# Patient Record
Sex: MALE | Race: WHITE | URBAN - METROPOLITAN AREA
[De-identification: names, ages, dates, MRNs, and addresses within clinical notes are randomized per-mention and may not be internally consistent; named-entity substitution may affect disease eponyms.]

---

## 2018-03-02 ENCOUNTER — OFFICE VISIT (OUTPATIENT)
Dept: INTERNAL MEDICINE CLINIC | Age: 63
End: 2018-03-02

## 2018-03-02 VITALS
BODY MASS INDEX: 28 KG/M2 | HEART RATE: 93 BPM | OXYGEN SATURATION: 95 % | HEIGHT: 71 IN | SYSTOLIC BLOOD PRESSURE: 133 MMHG | DIASTOLIC BLOOD PRESSURE: 73 MMHG | TEMPERATURE: 98.7 F | RESPIRATION RATE: 18 BRPM | WEIGHT: 200 LBS

## 2018-03-02 DIAGNOSIS — G25.0 ESSENTIAL TREMOR: ICD-10-CM

## 2018-03-02 DIAGNOSIS — Z98.890 HX OF NECK SURGERY: ICD-10-CM
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
## 2018-03-02 DIAGNOSIS — S02.2XXS CLOSED FRACTURE OF NASAL BONE, SEQUELA: ICD-10-CM

## 2018-03-02 DIAGNOSIS — R51.9 NONINTRACTABLE HEADACHE, UNSPECIFIED CHRONICITY PATTERN, UNSPECIFIED HEADACHE TYPE: ICD-10-CM

## 2018-03-02 DIAGNOSIS — C79.82 METASTATIC ADENOCARCINOMA TO PROSTATE (HCC): Primary | ICD-10-CM

## 2018-03-02 DIAGNOSIS — F41.9 ANXIETY: ICD-10-CM

## 2018-03-02 DIAGNOSIS — F32.A MILD DEPRESSION: ICD-10-CM

## 2018-03-02 RX ORDER — CLONAZEPAM 0.5 MG/1
0.5 TABLET ORAL 4 TIMES DAILY
Qty: 120 TAB | Refills: 0 | Status: SHIPPED | OUTPATIENT
Start: 2018-03-02 | End: 2018-07-26

## 2018-03-02 RX ORDER — PHENOBARBITAL 60 MG/1
60 TABLET ORAL 4 TIMES DAILY
Qty: 120 TAB | Refills: 3 | Status: SHIPPED | OUTPATIENT
Start: 2018-03-02 | End: 2018-08-31 | Stop reason: SDUPTHER

## 2018-03-02 RX ORDER — VENLAFAXINE HYDROCHLORIDE 150 MG/1
CAPSULE, EXTENDED RELEASE ORAL DAILY
COMMUNITY
End: 2018-07-26

## 2018-03-02 RX ORDER — ATORVASTATIN CALCIUM 20 MG/1
TABLET, FILM COATED ORAL DAILY
COMMUNITY
End: 2019-04-11 | Stop reason: SDUPTHER

## 2018-03-02 RX ORDER — CLONAZEPAM 0.5 MG/1
TABLET ORAL
COMMUNITY
End: 2018-03-02 | Stop reason: SDUPTHER

## 2018-03-02 RX ORDER — PHENOBARBITAL 60 MG/1
TABLET ORAL 2 TIMES DAILY
COMMUNITY
End: 2018-03-02 | Stop reason: SDUPTHER

## 2018-03-02 RX ORDER — HYDROCODONE BITARTRATE AND ACETAMINOPHEN 5; 325 MG/1; MG/1
1 TABLET ORAL
Qty: 30 TAB | Refills: 0 | Status: SHIPPED | OUTPATIENT
Start: 2018-03-02 | End: 2018-07-26

## 2018-03-02 RX ORDER — OMEGA-3-ACID ETHYL ESTERS 1 G/1
2 CAPSULE, LIQUID FILLED ORAL 2 TIMES DAILY
COMMUNITY
End: 2021-03-02
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
## 2018-03-05 DIAGNOSIS — R51.9 NONINTRACTABLE HEADACHE, UNSPECIFIED CHRONICITY PATTERN, UNSPECIFIED HEADACHE TYPE: ICD-10-CM

## 2018-03-05 DIAGNOSIS — Z98.890 HX OF NECK SURGERY: ICD-10-CM

## 2018-03-06 NOTE — PATIENT INSTRUCTIONS
Anxiety Disorder: Care Instructions  Your Care Instructions    Anxiety is a normal reaction to stress. Difficult situations can cause you to have symptoms such as sweaty palms and a nervous feeling. In an anxiety disorder, the symptoms are far more severe. Constant worry, muscle tension, trouble sleeping, nausea and diarrhea, and other symptoms can make normal daily activities difficult or impossible. These symptoms may occur for no reason, and they can affect your work, school, or social life. Medicines, counseling, and self-care can all help. Follow-up care is a key part of your treatment and safety. Be sure to make and go to all appointments, and call your doctor if you are having problems. It's also a good idea to know your test results and keep a list of the medicines you take. How can you care for yourself at home? · Take medicines exactly as directed. Call your doctor if you think you are having a problem with your medicine. · Go to your counseling sessions and follow-up appointments. · Recognize and accept your anxiety. Then, when you are in a situation that makes you anxious, say to yourself, \"This is not an emergency. I feel uncomfortable, but I am not in danger. I can keep going even if I feel anxious. \"  · Be kind to your body:  ¨ Relieve tension with exercise or a massage. ¨ Get enough rest.  ¨ Avoid alcohol, caffeine, nicotine, and illegal drugs. They can increase your anxiety level and cause sleep problems. ¨ Learn and do relaxation techniques. See below for more about these techniques. · Engage your mind. Get out and do something you enjoy. Go to a funny movie, or take a walk or hike. Plan your day. Having too much or too little to do can make you anxious. · Keep a record of your symptoms. Discuss your fears with a good friend or family member, or join a support group for people with similar problems. Talking to others sometimes relieves stress.   · Get involved in social groups, or volunteer to help others. Being alone sometimes makes things seem worse than they are. · Get at least 30 minutes of exercise on most days of the week to relieve stress. Walking is a good choice. You also may want to do other activities, such as running, swimming, cycling, or playing tennis or team sports. Relaxation techniques  Do relaxation exercises 10 to 20 minutes a day. You can play soothing, relaxing music while you do them, if you wish. · Tell others in your house that you are going to do your relaxation exercises. Ask them not to disturb you. · Find a comfortable place, away from all distractions and noise. · Lie down on your back, or sit with your back straight. · Focus on your breathing. Make it slow and steady. · Breathe in through your nose. Breathe out through either your nose or mouth. · Breathe deeply, filling up the area between your navel and your rib cage. Breathe so that your belly goes up and down. · Do not hold your breath. · Breathe like this for 5 to 10 minutes. Notice the feeling of calmness throughout your whole body. As you continue to breathe slowly and deeply, relax by doing the following for another 5 to 10 minutes:  · Tighten and relax each muscle group in your body. You can begin at your toes and work your way up to your head. · Imagine your muscle groups relaxing and becoming heavy. · Empty your mind of all thoughts. · Let yourself relax more and more deeply. · Become aware of the state of calmness that surrounds you. · When your relaxation time is over, you can bring yourself back to alertness by moving your fingers and toes and then your hands and feet and then stretching and moving your entire body. Sometimes people fall asleep during relaxation, but they usually wake up shortly afterward. · Always give yourself time to return to full alertness before you drive a car or do anything that might cause an accident if you are not fully alert.  Never play a relaxation tape while you drive a car. When should you call for help? Call 911 anytime you think you may need emergency care. For example, call if:  ? · You feel you cannot stop from hurting yourself or someone else. ? Keep the numbers for these national suicide hotlines: 0-548-700-TALK (5-663.235.8151) and 7-180-OGMUQJH (5-852.571.8178). If you or someone you know talks about suicide or feeling hopeless, get help right away. ? Watch closely for changes in your health, and be sure to contact your doctor if:  ? · You have anxiety or fear that affects your life. ? · You have symptoms of anxiety that are new or different from those you had before. Where can you learn more? Go to http://chris-clarissa.info/. Enter P754 in the search box to learn more about \"Anxiety Disorder: Care Instructions. \"  Current as of: May 12, 2017  Content Version: 11.4  © 6583-7611 [employer]. Care instructions adapted under license by [employer] (which disclaims liability or warranty for this information). If you have questions about a medical condition or this instruction, always ask your healthcare professional. [address] any warranty or liability for your use of this information.

## 2018-07-26 ENCOUNTER — OFFICE VISIT (OUTPATIENT)
Dept: INTERNAL MEDICINE CLINIC | Age: 63
End: 2018-07-26

## 2018-07-26 VITALS
TEMPERATURE: 98.8 F | HEART RATE: 132 BPM | WEIGHT: 210 LBS | HEIGHT: 71 IN | BODY MASS INDEX: 29.4 KG/M2 | RESPIRATION RATE: 20 BRPM | DIASTOLIC BLOOD PRESSURE: 69 MMHG | SYSTOLIC BLOOD PRESSURE: 103 MMHG | OXYGEN SATURATION: 93 %

## 2018-07-26 DIAGNOSIS — R69 TAKING MULTIPLE MEDICATIONS FOR CHRONIC DISEASE: ICD-10-CM

## 2018-07-26 DIAGNOSIS — C79.82 METASTATIC ADENOCARCINOMA TO PROSTATE (HCC): Primary | ICD-10-CM

## 2018-07-26 DIAGNOSIS — F41.9 ANXIETY: ICD-10-CM

## 2018-07-26 DIAGNOSIS — G47.00 INSOMNIA, UNSPECIFIED TYPE: ICD-10-CM

## 2018-07-26 DIAGNOSIS — F32.A MILD DEPRESSION: ICD-10-CM

## 2018-07-26 DIAGNOSIS — S32.030S CLOSED COMPRESSION FRACTURE OF THIRD LUMBAR VERTEBRA, SEQUELA: ICD-10-CM

## 2018-07-26 PROBLEM — S32.030A CLOSED COMPRESSION FRACTURE OF L3 LUMBAR VERTEBRA: Status: ACTIVE | Noted: 2018-07-26

## 2018-07-26 RX ORDER — MIRTAZAPINE 15 MG/1
TABLET, FILM COATED ORAL
COMMUNITY
End: 2018-07-26 | Stop reason: SDUPTHER

## 2018-07-26 RX ORDER — OXYCODONE AND ACETAMINOPHEN 5; 325 MG/1; MG/1
1 TABLET ORAL
Qty: 30 TAB | Refills: 0 | Status: SHIPPED | OUTPATIENT
Start: 2018-07-26 | End: 2018-08-02

## 2018-07-26 RX ORDER — VENLAFAXINE 100 MG/1
100 TABLET ORAL 3 TIMES DAILY
Qty: 90 TAB | Refills: 2 | Status: SHIPPED | OUTPATIENT
Start: 2018-07-26 | End: 2019-04-03 | Stop reason: SDUPTHER

## 2018-07-26 RX ORDER — NALOXONE HYDROCHLORIDE 4 MG/.1ML
SPRAY NASAL
Qty: 1 EACH | Refills: 0 | Status: SHIPPED | OUTPATIENT
Start: 2018-07-26 | End: 2018-10-23

## 2018-07-26 RX ORDER — ALPRAZOLAM 0.5 MG/1
0.5 TABLET ORAL
Qty: 90 TAB | Refills: 2 | Status: SHIPPED | OUTPATIENT
Start: 2018-07-26 | End: 2018-10-23

## 2018-07-26 RX ORDER — GABAPENTIN 600 MG/1
600 TABLET ORAL 3 TIMES DAILY
Qty: 90 TAB | Refills: 2 | Status: SHIPPED | OUTPATIENT
Start: 2018-07-26 | End: 2019-05-22 | Stop reason: SDUPTHER

## 2018-07-26 RX ORDER — VENLAFAXINE HYDROCHLORIDE 75 MG/1
75 CAPSULE, EXTENDED RELEASE ORAL 3 TIMES DAILY
COMMUNITY
End: 2018-07-26 | Stop reason: ALTCHOICE

## 2018-07-26 RX ORDER — GABAPENTIN 600 MG/1
TABLET ORAL 3 TIMES DAILY
COMMUNITY
End: 2018-07-26 | Stop reason: SDUPTHER

## 2018-07-26 RX ORDER — MIRTAZAPINE 15 MG/1
15 TABLET, FILM COATED ORAL
Qty: 30 TAB | Refills: 2 | Status: SHIPPED | OUTPATIENT
Start: 2018-07-26 | End: 2018-11-01 | Stop reason: SDUPTHER

## 2018-07-26 NOTE — PROGRESS NOTES
HISTORY OF PRESENT ILLNESS  Morro Jefferson is a 61 y.o. male. HPI   Pt is here for f/u and a refill on pain meds. He has metastatic prostate ca and has been unable to see oncology since last visit bc he is going through a bad separation and his ex had him arrested and he has been in snf in [place] for the past 2 months. He has his oncology appt tomorrow and neurology appt is aug 3 and aug 7. He is very anxious over his cancer and xrays done in [place] showed a compression fx of his lumbar spine that they are concerned could be from METs. No Known Allergies    Current Outpatient Prescriptions   Medication Sig    venlafaxine (EFFEXOR) 100 mg tablet Take 1 Tab by mouth three (3) times daily.  gabapentin (NEURONTIN) 600 mg tablet Take 1 Tab by mouth three (3) times daily.  mirtazapine (REMERON) 15 mg tablet Take 1 Tab by mouth nightly.  ALPRAZolam (XANAX) 0.5 mg tablet Take 1 Tab by mouth three (3) times daily as needed for Anxiety. Max Daily Amount: 1.5 mg.    oxyCODONE-acetaminophen (PERCOCET) 5-325 mg per tablet Take 1 Tab by mouth every six (6) hours as needed for Pain for up to 7 days. Max Daily Amount: 4 Tabs.  naloxone (NARCAN) 4 mg/actuation nasal spray Use 1 spray intranasally, then discard. Repeat with new spray every 2 min as needed for opioid overdose symptoms, alternating nostrils.  AMLODIPINE BESYLATE (AMLODIPINE PO) Take  by mouth.  omega-3 acid ethyl esters (LOVAZA) 1 gram capsule Take 2 g by mouth two (2) times a day.  atorvastatin (LIPITOR) 20 mg tablet Take  by mouth daily.  leuprolide (ELIGARD, 3 MONTH,) 22.5 mg syrg injection 22.5 mg by SubCUTAneous route once.  PHENobarbital (LUMINAL) 60 mg tablet Take 1 Tab by mouth four (4) times daily. Max Daily Amount: 240 mg. No current facility-administered medications for this visit. Review of Systems   Constitutional: Positive for malaise/fatigue. Negative for chills and fever. HENT: Negative.   Negative for congestion, ear pain, sore throat and tinnitus. Eyes: Negative. Negative for blurred vision, double vision and photophobia. Respiratory: Negative. Negative for cough, shortness of breath and wheezing. Cardiovascular: Negative. Negative for chest pain, palpitations and leg swelling. Gastrointestinal: Negative. Negative for abdominal pain, heartburn, nausea and vomiting. Genitourinary: Negative. Negative for dysuria, frequency, hematuria and urgency. Musculoskeletal: Positive for back pain (chronic) and neck pain. Negative for joint pain and myalgias. Skin: Negative. Negative for itching and rash. Neurological: Positive for tremors and headaches. Negative for dizziness and tingling. Psychiatric/Behavioral: Positive for depression. Negative for memory loss, substance abuse and suicidal ideas. The patient is nervous/anxious and has insomnia (due to anxiety). Visit Vitals    /69 (BP 1 Location: Left arm, BP Patient Position: Sitting)    Pulse (!) 132    Temp 98.8 °F (37.1 °C) (Oral)    Resp 20    Ht 5' 11\" (1.803 m)    Wt 210 lb (95.3 kg)    SpO2 93%    BMI 29.29 kg/m2       Physical Exam   Constitutional: He is oriented to person, place, and time. He appears well-developed. No distress. HENT:   Head: Normocephalic and atraumatic. Cardiovascular: Normal rate, regular rhythm and normal heart sounds. Pulmonary/Chest: Effort normal and breath sounds normal.   Abdominal: Soft. There is no tenderness. Musculoskeletal:        Cervical back: He exhibits decreased range of motion (due to pain) and tenderness. He exhibits no bony tenderness, no swelling, no edema and no deformity. Lumbar back: He exhibits decreased range of motion (due to pain), tenderness and bony tenderness. He exhibits no swelling, no edema and no deformity. Neurological: He is alert and oriented to person, place, and time. Skin: Skin is warm and dry. He is not diaphoretic.    Psychiatric: His speech is normal and behavior is normal. Thought content normal. His mood appears anxious (nervous about cancer dx). His affect is not angry. Thought content is not paranoid. He does not exhibit a depressed mood. He expresses no homicidal and no suicidal ideation. ASSESSMENT and PLAN    ICD-10-CM ICD-9-CM    1. Metastatic adenocarcinoma to prostate (HCC) C79.82 198.82 oxyCODONE-acetaminophen (PERCOCET) 5-325 mg per tablet   2. Mild depression (HCC) F32.0 311 venlafaxine (EFFEXOR) 100 mg tablet   3. Closed compression fracture of third lumbar vertebra, sequela S32.030S 905.1 gabapentin (NEURONTIN) 600 mg tablet      oxyCODONE-acetaminophen (PERCOCET) 5-325 mg per tablet   4. Insomnia, unspecified type G47.00 780.52 mirtazapine (REMERON) 15 mg tablet   5. Anxiety F41.9 300.00 ALPRAZolam (XANAX) 0.5 mg tablet   6. Taking multiple medications for chronic disease R69 799.9 naloxone (NARCAN) 4 mg/actuation nasal spray     Follow-up Disposition:  Return in about 3 months (around 10/26/2018) for follow up. Pt expressed understanding of visit summary and plans for any follow ups or referrals as well as any medications prescribed.

## 2018-07-26 NOTE — PROGRESS NOTES
Chief Complaint   Patient presents with    Shoulder Pain     Right shoulder pain. Has appt with Neurology 8/13 to have MRI and EMG. Asking for refill on pain meds to get him through until he sees the specilaist.      1. Have you been to the ER, urgent care clinic since your last visit? Hospitalized since your last visit? No    2. Have you seen or consulted any other health care providers outside of the [facility] since your last visit? Include any pap smears or colon screening.  Yes When: Oncology and Neurology

## 2018-07-31 NOTE — PATIENT INSTRUCTIONS
Depression and Chronic Disease: Care Instructions  Your Care Instructions    A chronic disease is one that you have for a long time. Some chronic diseases can be controlled, but they usually cannot be cured. Depression is common in people with chronic diseases, but it often goes unnoticed. Many people have concerns about seeking treatment for a mental health problem. You may think it's a sign of weakness, or you don't want people to know about it. It's important to overcome these reasons for not seeking treatment. Treating depression or anxiety is good for your health. Follow-up care is a key part of your treatment and safety. Be sure to make and go to all appointments, and call your doctor if you are having problems. It's also a good idea to know your test results and keep a list of the medicines you take. How can you care for yourself at home? Watch for symptoms of depression  The symptoms of depression are often subtle at first. You may think they are caused by your disease rather than depression. Or you may think it is normal to be depressed when you have a chronic disease. If you are depressed you may:  · Feel sad or hopeless. · Feel guilty or worthless. · Not enjoy the things you used to enjoy. · Feel hopeless, as though life is not worth living. · Have trouble thinking or remembering. · Have low energy, and you may not eat or sleep well. · Pull away from others. · Think often about death or killing yourself. (Keep the numbers for these national suicide hotlines: 8-772-574-TALK [1-190.786.5739] and 5-654-VWXUHVI [1-378.556.6393]. )  Get treatment  By treating your depression, you can feel more hopeful and have more energy. If you feel better, you may take better care of yourself, so your health may improve. · Talk to your doctor if you have any changes in mood during treatment for your disease. · Ask your doctor for help.  Counseling, antidepressant medicine, or a combination of the two can help most people with depression. Often a combination works best. Counseling can also help you cope with having a chronic disease. When should you call for help? Call 911 anytime you think you may need emergency care. For example, call if:    · You feel like hurting yourself or someone else.     · Someone you know has depression and is about to attempt or is attempting suicide.   [facility] your doctor now or seek immediate medical care if:    · You hear voices.     · Someone you know has depression and:  ¨ Starts to give away his or her possessions. ¨ Uses illegal drugs or drinks alcohol heavily. ¨ Talks or writes about death, including writing suicide notes or talking about guns, knives, or pills. ¨ Starts to spend a lot of time alone. ¨ Acts very aggressively or suddenly appears calm.    Watch closely for changes in your health, and be sure to contact your doctor if:    · You do not get better as expected. Where can you learn more? Go to http://chris-clarissa.info/. Enter B168 in the search box to learn more about \"Depression and Chronic Disease: Care Instructions. \"  Current as of: December 7, 2017  Content Version: 11.7  © 4379-2553 [employer]. Care instructions adapted under license by [employer] (which disclaims liability or warranty for this information). If you have questions about a medical condition or this instruction, always ask your healthcare professional. [address] any warranty or liability for your use of this information.

## 2018-08-31 DIAGNOSIS — G25.0 ESSENTIAL TREMOR: ICD-10-CM

## 2018-09-01 ENCOUNTER — TELEPHONE (OUTPATIENT)
Dept: INTERNAL MEDICINE CLINIC | Age: 63
End: 2018-09-01

## 2018-09-02 NOTE — PROGRESS NOTES
On call cross cover note     Patient called answering service to request medication change stating that he would like to go back to his previous dose of phenobarbital. When the patient was informed that he would need to see a prescriber face to face to obtain controlled substances he verbalized understanding. He subsequently asked for klonopin and oxycodone refills. Patient informed that these were also controlled substances. Advised to report to his nearest ER if having any complaints that he felt could not wait until he can schedule an appointment with his primary care provider. He denied any current seizure activity. Advised patient to dial 911 if he needed to report to the ER but was not safe to drive. Patient verbalized understanding.

## 2018-09-04 RX ORDER — PHENOBARBITAL 60 MG/1
60 TABLET ORAL 4 TIMES DAILY
Qty: 120 TAB | Refills: 0 | Status: SHIPPED | OUTPATIENT
Start: 2018-09-04 | End: 2019-06-05 | Stop reason: SDUPTHER

## 2018-09-04 NOTE — TELEPHONE ENCOUNTER
I didn't see this request until after leaving the office Friday so wasn't able to fill it until today. I've filled 1 month supply since Mireille is not here today and the patient needs this medicine.

## 2018-10-23 ENCOUNTER — TELEPHONE (OUTPATIENT)
Dept: INTERNAL MEDICINE CLINIC | Age: 63
End: 2018-10-23

## 2018-10-23 ENCOUNTER — OFFICE VISIT (OUTPATIENT)
Dept: INTERNAL MEDICINE CLINIC | Age: 63
End: 2018-10-23

## 2018-10-23 VITALS
BODY MASS INDEX: 29.12 KG/M2 | HEART RATE: 88 BPM | OXYGEN SATURATION: 97 % | HEIGHT: 71 IN | RESPIRATION RATE: 18 BRPM | DIASTOLIC BLOOD PRESSURE: 88 MMHG | WEIGHT: 208 LBS | SYSTOLIC BLOOD PRESSURE: 143 MMHG | TEMPERATURE: 98.1 F

## 2018-10-23 DIAGNOSIS — M79.641 PAIN OF RIGHT HAND: ICD-10-CM

## 2018-10-23 DIAGNOSIS — H65.112 ACUTE MUCOID OTITIS MEDIA OF LEFT EAR: ICD-10-CM
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
## 2018-10-23 DIAGNOSIS — F41.9 ANXIETY: ICD-10-CM

## 2018-10-23 DIAGNOSIS — M25.531 RIGHT WRIST PAIN: ICD-10-CM

## 2018-10-23 DIAGNOSIS — S52.571A OTHER CLOSED INTRA-ARTICULAR FRACTURE OF DISTAL END OF RIGHT RADIUS, INITIAL ENCOUNTER: Primary | ICD-10-CM

## 2018-10-23 RX ORDER — CLONAZEPAM 0.5 MG/1
TABLET ORAL
COMMUNITY
End: 2018-10-23 | Stop reason: DRUGHIGH

## 2018-10-23 RX ORDER — CLONAZEPAM 1 MG/1
1 TABLET ORAL 2 TIMES DAILY
Qty: 30 TAB | Refills: 0 | Status: SHIPPED | OUTPATIENT
Start: 2018-10-23 | End: 2018-11-09

## 2018-10-23 RX ORDER — AMOXICILLIN AND CLAVULANATE POTASSIUM 875; 125 MG/1; MG/1
1 TABLET, FILM COATED ORAL 2 TIMES DAILY
Qty: 20 TAB | Refills: 0 | Status: SHIPPED | OUTPATIENT
Start: 2018-10-23 | End: 2018-11-02

## 2018-10-23 NOTE — PROGRESS NOTES
Chief Complaint   Patient presents with    Sinus Pain     For 1 week left sinus congestion and head pain.  Medication Refill     Starting up with [facility] mail pharmacy, they will contact us for prescriptions.  Hand Injury     Fell at [employer] last week and hit right hand/wrist on metal rack. No swollen and bruised, pain in wrist.       1. Have you been to the ER, urgent care clinic since your last visit? Hospitalized since your last visit? No    2. Have you seen or consulted any other health care providers outside of the [address] [place] [address] since your last visit? Include any pap smears or colon screening.  Yes Where: Oncology

## 2018-10-23 NOTE — TELEPHONE ENCOUNTER
Please advise pt he does have a fx of his distal radiaus and I am sending him to ortho for eval and tx

## 2018-11-06 NOTE — PROGRESS NOTES
HISTORY OF PRESENT ILLNESS  Margarita Mai is a 61 y.o. male. HPI   Pt her efor f/u on anxiety and is doing well on meds. He is c/o left ear pain for the past 2-3 days as well as congestion. Denies fever, chills, N/V/D, dizziness, HA, CP, SOB, palpitations, ST, cough, and tinnitus. Pt also fell last week and landed on his right wrist/arm and it has been swollen, bruised and tender since. Denies numbness/tingling, radiating pain, weakness, and abrasions. No Known Allergies    Current Outpatient Medications   Medication Sig    clonazePAM (KLONOPIN) 1 mg tablet Take 1 Tab by mouth two (2) times a day. Max Daily Amount: 2 mg.  PHENobarbital (LUMINAL) 60 mg tablet Take 1 Tab by mouth four (4) times daily. Max Daily Amount: 240 mg.    venlafaxine (EFFEXOR) 100 mg tablet Take 1 Tab by mouth three (3) times daily.  gabapentin (NEURONTIN) 600 mg tablet Take 1 Tab by mouth three (3) times daily.  AMLODIPINE BESYLATE (AMLODIPINE PO) Take  by mouth.  omega-3 acid ethyl esters (LOVAZA) 1 gram capsule Take 2 g by mouth two (2) times a day.  atorvastatin (LIPITOR) 20 mg tablet Take  by mouth daily.  leuprolide (ELIGARD, 3 MONTH,) 22.5 mg syrg injection 22.5 mg by SubCUTAneous route once.  mirtazapine (REMERON) 15 mg tablet TAKE 1 TABLET BY MOUTH NIGHTLY     No current facility-administered medications for this visit. Review of Systems   Constitutional: Positive for malaise/fatigue. Negative for chills and fever. HENT: Positive for ear pain (left ear). Negative for congestion, sore throat and tinnitus. Eyes: Negative. Negative for blurred vision, double vision and photophobia. Respiratory: Negative. Negative for cough, shortness of breath and wheezing. Cardiovascular: Negative. Negative for chest pain, palpitations and leg swelling. Gastrointestinal: Negative. Negative for abdominal pain, heartburn, nausea and vomiting. Genitourinary: Negative.   Negative for dysuria, frequency, hematuria and urgency. Musculoskeletal: Positive for back pain (chronic) and joint pain (right wrist). Negative for myalgias (right arm). Skin: Negative. Negative for itching and rash. Neurological: Negative for dizziness and tingling. Psychiatric/Behavioral: Negative for memory loss, substance abuse and suicidal ideas. The patient is nervous/anxious (controlled on meds) and has insomnia (due to anxiety). Visit Vitals  /88 (BP 1 Location: Left arm, BP Patient Position: Sitting)   Pulse 88   Temp 98.1 °F (36.7 °C) (Oral)   Resp 18   Ht 5' 11\" (1.803 m)   Wt 208 lb (94.3 kg)   SpO2 97%   BMI 29.01 kg/m²       Physical Exam   Constitutional: He is oriented to person, place, and time. He appears well-developed. No distress. HENT:   Head: Normocephalic and atraumatic. Right Ear: Tympanic membrane, external ear and ear canal normal. No middle ear effusion. Left Ear: External ear and ear canal normal. Tympanic membrane is erythematous. A middle ear effusion is present. Nose: Mucosal edema present. No rhinorrhea. Right sinus exhibits no maxillary sinus tenderness and no frontal sinus tenderness. Left sinus exhibits no maxillary sinus tenderness and no frontal sinus tenderness. Mouth/Throat: Uvula is midline, oropharynx is clear and moist and mucous membranes are normal.   Cardiovascular: Normal rate, regular rhythm and normal heart sounds. Pulmonary/Chest: Effort normal and breath sounds normal.   Abdominal: Soft. There is no tenderness. Musculoskeletal:        Right wrist: He exhibits decreased range of motion (due to pain), tenderness and swelling (ecchymosis). Cervical back: He exhibits no bony tenderness, no swelling, no edema and no deformity. Right forearm: He exhibits tenderness and swelling (ecchymosis). Neurological: He is alert and oriented to person, place, and time. Skin: Skin is warm and dry. He is not diaphoretic.    Psychiatric: His speech is normal and behavior is normal. Thought content normal. His mood appears anxious (nervous). His affect is not angry. Thought content is not paranoid. He does not exhibit a depressed mood. He expresses no homicidal and no suicidal ideation. ASSESSMENT and PLAN    ICD-10-CM ICD-9-CM    1. Fall, initial encounter [address]. XXXA E888.9    2. Pain of right hand M79.641 729.5 XR HAND RT MIN 3 V   3. Right wrist pain M25.531 719.43 XR WRIST RT AP/LAT/OBL MIN 3V   4. Acute mucoid otitis media of left ear H65.112 381.02 amoxicillin-clavulanate (AUGMENTIN) 875-125 mg per tablet   5. Anxiety F41.9 300.00 clonazePAM (KLONOPIN) 1 mg tablet     Follow-up Disposition:  Return in about 3 months (around 1/23/2019) for follow up. Pt expressed understanding of visit summary and plans for any follow ups or referrals as well as any medications prescribed.

## 2018-11-06 NOTE — PATIENT INSTRUCTIONS

## 2018-11-08 DIAGNOSIS — F41.9 ANXIETY: ICD-10-CM

## 2018-11-09 RX ORDER — CLONAZEPAM 0.5 MG/1
0.5 TABLET ORAL
Qty: 360 TAB | Refills: 0 | Status: SHIPPED | OUTPATIENT
Start: 2018-11-09 | End: 2019-03-04 | Stop reason: SDUPTHER

## 2018-11-09 RX ORDER — CLONAZEPAM 0.5 MG/1
0.5 TABLET ORAL
Qty: 360 TAB | Refills: 0 | Status: SHIPPED | OUTPATIENT
Start: 2018-11-09 | End: 2018-11-09 | Stop reason: SDUPTHER

## 2018-11-09 RX ORDER — CLONAZEPAM 1 MG/1
1 TABLET ORAL 2 TIMES DAILY
Qty: 90 TAB | Refills: 0 | Status: CANCELLED | OUTPATIENT
Start: 2018-11-09

## 2018-11-20 RX ORDER — AMLODIPINE AND BENAZEPRIL HYDROCHLORIDE 5; 10 MG/1; MG/1
1 CAPSULE ORAL DAILY
Qty: 90 CAP | Refills: 1 | Status: SHIPPED | OUTPATIENT
Start: 2018-11-20 | End: 2019-04-15 | Stop reason: SDUPTHER

## 2019-02-04 ENCOUNTER — PATIENT OUTREACH (OUTPATIENT)
Dept: INTERNAL MEDICINE CLINIC | Age: 64
End: 2019-02-04

## 2019-02-27 DIAGNOSIS — F41.9 ANXIETY: ICD-10-CM

## 2019-03-04 RX ORDER — CLONAZEPAM 0.5 MG/1
TABLET ORAL
Qty: 360 TAB | Refills: 0 | Status: SHIPPED | OUTPATIENT
Start: 2019-03-04 | End: 2019-06-02

## 2019-04-03 DIAGNOSIS — F32.A MILD DEPRESSION: ICD-10-CM

## 2019-04-03 DIAGNOSIS — G47.00 INSOMNIA, UNSPECIFIED TYPE: ICD-10-CM

## 2019-04-05 RX ORDER — VENLAFAXINE 100 MG/1
TABLET ORAL
Qty: 270 TAB | Refills: 1 | Status: SHIPPED | OUTPATIENT
Start: 2019-04-05 | End: 2019-07-03 | Stop reason: DRUGHIGH

## 2019-04-05 RX ORDER — MIRTAZAPINE 15 MG/1
TABLET, FILM COATED ORAL
Qty: 90 TAB | Refills: 1 | Status: SHIPPED | OUTPATIENT
Start: 2019-04-05 | End: 2019-08-27 | Stop reason: SDUPTHER

## 2019-04-16 RX ORDER — ATORVASTATIN CALCIUM 20 MG/1
TABLET, FILM COATED ORAL
Qty: 90 TAB | Refills: 1 | Status: SHIPPED | OUTPATIENT
Start: 2019-04-16 | End: 2021-04-27 | Stop reason: SDUPTHER

## 2019-04-18 RX ORDER — AMLODIPINE AND BENAZEPRIL HYDROCHLORIDE 5; 10 MG/1; MG/1
CAPSULE ORAL
Qty: 90 CAP | Refills: 1 | Status: SHIPPED | OUTPATIENT
Start: 2019-04-18 | End: 2019-12-27

## 2019-05-22 DIAGNOSIS — S32.030S CLOSED COMPRESSION FRACTURE OF THIRD LUMBAR VERTEBRA, SEQUELA: ICD-10-CM

## 2019-05-22 RX ORDER — GABAPENTIN 600 MG/1
TABLET ORAL
Qty: 90 TAB | Refills: 0 | Status: SHIPPED | OUTPATIENT
Start: 2019-05-22 | End: 2019-07-03 | Stop reason: SDUPTHER

## 2019-05-25 DIAGNOSIS — G25.0 ESSENTIAL TREMOR: ICD-10-CM

## 2019-06-04 RX ORDER — PHENOBARBITAL 60 MG/1
TABLET ORAL
Qty: 360 TAB | Refills: 1 | OUTPATIENT
Start: 2019-06-04

## 2019-06-04 NOTE — TELEPHONE ENCOUNTER
Pt is requesting to have you fill it because his phycologist in [place]  is the one filled it but he no longer sees the provider due to him being in [place]. Pt is completely out of this medication. He will be coming in Monday for a OV.

## 2019-06-05 RX ORDER — PHENOBARBITAL 60 MG/1
60 TABLET ORAL 4 TIMES DAILY
Qty: 120 TAB | Refills: 0 | OUTPATIENT
Start: 2019-06-05 | End: 2019-06-15 | Stop reason: SDUPTHER

## 2019-06-15 DIAGNOSIS — G25.0 ESSENTIAL TREMOR: ICD-10-CM

## 2019-06-17 RX ORDER — PHENOBARBITAL 60 MG/1
TABLET ORAL
Qty: 360 TAB | Refills: 1 | Status: SHIPPED | OUTPATIENT
Start: 2019-06-17 | End: 2019-07-01 | Stop reason: SDUPTHER

## 2019-07-01 DIAGNOSIS — G25.0 ESSENTIAL TREMOR: ICD-10-CM

## 2019-07-01 NOTE — TELEPHONE ENCOUNTER
Patient called requesting refill on Phenobarbital 60mg   Patient last appointment was 10/23/18  Patient next appointment is 07/03/19  Pharmacy patient wants refill to go to is [facility] on [address]

## 2019-07-03 ENCOUNTER — OFFICE VISIT (OUTPATIENT)
Dept: INTERNAL MEDICINE CLINIC | Age: 64
End: 2019-07-03

## 2019-07-03 DIAGNOSIS — S52.571A OTHER CLOSED INTRA-ARTICULAR FRACTURE OF DISTAL END OF RIGHT RADIUS, INITIAL ENCOUNTER: ICD-10-CM

## 2019-07-03 DIAGNOSIS — Z98.890 HX OF NECK SURGERY: ICD-10-CM

## 2019-07-03 DIAGNOSIS — R53.81 MALAISE: ICD-10-CM

## 2019-07-03 DIAGNOSIS — G25.0 ESSENTIAL TREMOR: ICD-10-CM

## 2019-07-03 DIAGNOSIS — C79.82 METASTATIC ADENOCARCINOMA TO PROSTATE (HCC): Primary | ICD-10-CM

## 2019-07-03 DIAGNOSIS — R69 TAKING MULTIPLE MEDICATIONS FOR CHRONIC DISEASE: ICD-10-CM

## 2019-07-03 DIAGNOSIS — S32.030S CLOSED COMPRESSION FRACTURE OF THIRD LUMBAR VERTEBRA, SEQUELA: ICD-10-CM

## 2019-07-03 DIAGNOSIS — J98.9 REACTIVE AIRWAY DISEASE THAT IS NOT ASTHMA: ICD-10-CM

## 2019-07-03 DIAGNOSIS — Z00.00 ENCOUNTER FOR MEDICARE ANNUAL WELLNESS EXAM: ICD-10-CM

## 2019-07-03 DIAGNOSIS — F32.A MILD DEPRESSION: ICD-10-CM

## 2019-07-03 RX ORDER — PHENOBARBITAL 60 MG/1
TABLET ORAL
Qty: 360 TAB | Refills: 1 | Status: SHIPPED | OUTPATIENT
Start: 2019-07-03 | End: 2020-01-24

## 2019-07-03 RX ORDER — VENLAFAXINE HYDROCHLORIDE 150 MG/1
150 CAPSULE, EXTENDED RELEASE ORAL DAILY
Qty: 30 CAP | Refills: 3 | Status: SHIPPED | OUTPATIENT
Start: 2019-07-03 | End: 2019-08-28 | Stop reason: ALTCHOICE

## 2019-07-03 RX ORDER — GABAPENTIN 600 MG/1
TABLET ORAL
Qty: 90 TAB | Refills: 3 | Status: SHIPPED | OUTPATIENT
Start: 2019-07-03 | End: 2019-12-13 | Stop reason: SDUPTHER

## 2019-07-03 RX ORDER — ALBUTEROL SULFATE 90 UG/1
2 AEROSOL, METERED RESPIRATORY (INHALATION)
Qty: 1 INHALER | Refills: 1 | Status: SHIPPED | OUTPATIENT
Start: 2019-07-03 | End: 2020-01-30 | Stop reason: SDUPTHER

## 2019-07-03 RX ORDER — OXYCODONE HYDROCHLORIDE 5 MG/1
5 TABLET ORAL
Qty: 30 TAB | Refills: 0 | Status: SHIPPED | OUTPATIENT
Start: 2019-07-03 | End: 2019-07-10

## 2019-07-03 NOTE — PROGRESS NOTES
Chief Complaint   Patient presents with    Medication Refill     1. Have you been to the ER, urgent care clinic since your last visit? Hospitalized since your last visit? Yes When: ER    2. Have you seen or consulted any other health care providers outside of the [address] [place] [address] since your last visit? Include any pap smears or colon screening.  Yes Where: Oncology

## 2019-07-07 LAB
ALBUMIN SERPL-MCNC: 4.1 G/DL (ref 3.6–4.8)
ALBUMIN/GLOB SERPL: 1.6 {RATIO} (ref 1.2–2.2)
ALP SERPL-CCNC: 130 IU/L (ref 39–117)
ALT SERPL-CCNC: 21 IU/L (ref 0–44)
AST SERPL-CCNC: 15 IU/L (ref 0–40)
BASOPHILS # BLD AUTO: 0 X10E3/UL (ref 0–0.2)
BASOPHILS NFR BLD AUTO: 0 %
BILIRUB SERPL-MCNC: 0.3 MG/DL (ref 0–1.2)
BUN SERPL-MCNC: 9 MG/DL (ref 8–27)
BUN/CREAT SERPL: 12 (ref 10–24)
CALCIUM SERPL-MCNC: 9.6 MG/DL (ref 8.6–10.2)
CHLORIDE SERPL-SCNC: 102 MMOL/L (ref 96–106)
CHOLEST SERPL-MCNC: 121 MG/DL (ref 100–199)
CO2 SERPL-SCNC: 26 MMOL/L (ref 20–29)
CREAT SERPL-MCNC: 0.77 MG/DL (ref 0.76–1.27)
EOSINOPHIL # BLD AUTO: 0.1 X10E3/UL (ref 0–0.4)
EOSINOPHIL NFR BLD AUTO: 1 %
ERYTHROCYTE [DISTWIDTH] IN BLOOD BY AUTOMATED COUNT: 17.1 % (ref 12.3–15.4)
GLOBULIN SER CALC-MCNC: 2.6 G/DL (ref 1.5–4.5)
GLUCOSE SERPL-MCNC: 85 MG/DL (ref 65–99)
HCT VFR BLD AUTO: 43.5 % (ref 37.5–51)
HDLC SERPL-MCNC: 50 MG/DL
HGB BLD-MCNC: 14 G/DL (ref 13–17.7)
IMM GRANULOCYTES # BLD AUTO: 0 X10E3/UL (ref 0–0.1)
IMM GRANULOCYTES NFR BLD AUTO: 0 %
LDLC SERPL CALC-MCNC: 56 MG/DL (ref 0–99)
LYMPHOCYTES # BLD AUTO: 1.8 X10E3/UL (ref 0.7–3.1)
LYMPHOCYTES NFR BLD AUTO: 33 %
MCH RBC QN AUTO: 30.6 PG (ref 26.6–33)
MCHC RBC AUTO-ENTMCNC: 32.2 G/DL (ref 31.5–35.7)
MCV RBC AUTO: 95 FL (ref 79–97)
MONOCYTES # BLD AUTO: 0.4 X10E3/UL (ref 0.1–0.9)
MONOCYTES NFR BLD AUTO: 6 %
NEUTROPHILS # BLD AUTO: 3.3 X10E3/UL (ref 1.4–7)
NEUTROPHILS NFR BLD AUTO: 60 %
PHENOBARB SERPL-MCNC: <3 UG/ML (ref 15–40)
PLATELET # BLD AUTO: 262 X10E3/UL (ref 150–450)
POTASSIUM SERPL-SCNC: 4.5 MMOL/L (ref 3.5–5.2)
PROT SERPL-MCNC: 6.7 G/DL (ref 6–8.5)
PSA SERPL-MCNC: 38 NG/ML (ref 0–4)
RBC # BLD AUTO: 4.58 X10E6/UL (ref 4.14–5.8)
SODIUM SERPL-SCNC: 142 MMOL/L (ref 134–144)
TESTOST FREE SERPL-MCNC: 5 PG/ML (ref 6.6–18.1)
TESTOST SERPL-MCNC: 393 NG/DL (ref 264–916)
TRIGL SERPL-MCNC: 74 MG/DL (ref 0–149)
TSH SERPL DL<=0.005 MIU/L-ACNC: 2.16 UIU/ML (ref 0.45–4.5)
VLDLC SERPL CALC-MCNC: 15 MG/DL (ref 5–40)
WBC # BLD AUTO: 5.5 X10E3/UL (ref 3.4–10.8)

## 2019-07-12 NOTE — PROGRESS NOTES
HISTORY OF PRESENT ILLNESS  Teo Yañez is a 59 y.o. male. HPI   Pt is here for med refills and fasting labs. He is doing well on current meds and current doses. He has a fall again in May and fractured his elbow and developed bursitis in it last week. He went to the ER and had it drained but it has returned. Advised him to f/u with ortho for it due to the recent traum and it recurring. Denies fever, warmth, drainage, fever, and chills. He is continuing to see oncology for his metastatic prostate cancer and there have been no changes. He is also due for his medicare wellness. No Known Allergies    Current Outpatient Medications   Medication Sig    gabapentin (NEURONTIN) 600 mg tablet TAKE 1 TABLET BY MOUTH THREE TIMES DAILY    albuterol (PROVENTIL HFA, VENTOLIN HFA, PROAIR HFA) 90 mcg/actuation inhaler Take 2 Puffs by inhalation every six (6) hours as needed for Wheezing.  venlafaxine-SR (EFFEXOR-XR) 150 mg capsule Take 1 Cap by mouth daily.  amLODIPine-benazepril (LOTREL) 5-10 mg per capsule TAKE 1 CAPSULE EVERY DAY    atorvastatin (LIPITOR) 20 mg tablet TAKE 1 TABLET EVERY DAY    mirtazapine (REMERON) 15 mg tablet TAKE 1 TABLET EVERY NIGHT    omega-3 acid ethyl esters (LOVAZA) 1 gram capsule Take 2 g by mouth two (2) times a day.  leuprolide (ELIGARD, 3 MONTH,) 22.5 mg syrg injection 22.5 mg by SubCUTAneous route once.  ALPRAZolam (XANAX) 0.5 mg tablet Take 1 Tab by mouth three (3) times daily as needed for Anxiety for up to 90 days. Max Daily Amount: 1.5 mg.    PHENobarbital (LUMINAL) 60 mg tablet TAKE 1 TABLET FOUR TIMES DAILY    AMLODIPINE BESYLATE (AMLODIPINE PO) Take  by mouth. No current facility-administered medications for this visit. Review of Systems   Constitutional: Positive for malaise/fatigue. Negative for chills and fever. HENT: Negative. Negative for congestion, ear pain, sore throat and tinnitus. Eyes: Negative.   Negative for blurred vision, double vision and photophobia. Respiratory: Negative. Negative for cough, shortness of breath and wheezing. Cardiovascular: Negative. Negative for chest pain, palpitations and leg swelling. Gastrointestinal: Negative. Negative for abdominal pain, heartburn, nausea and vomiting. Genitourinary: Negative. Negative for dysuria, frequency, hematuria and urgency. Musculoskeletal: Positive for back pain (chronic) and neck pain. Negative for joint pain and myalgias. Skin: Negative. Negative for itching and rash. Neurological: Positive for tremors and headaches. Negative for dizziness and tingling. Psychiatric/Behavioral: Positive for depression. Negative for memory loss, substance abuse and suicidal ideas. The patient is nervous/anxious and has insomnia (due to anxiety). Visit Vitals  /88 (BP 1 Location: Left arm, BP Patient Position: Sitting)   Pulse 85   Temp 98.1 °F (36.7 °C) (Oral)   Resp 16   Ht 5' 11\" (1.803 m)   Wt 211 lb (95.7 kg)   SpO2 95%   BMI 29.43 kg/m²       Physical Exam   Constitutional: He is oriented to person, place, and time. He appears well-developed. No distress. HENT:   Head: Normocephalic and atraumatic. Cardiovascular: Normal rate, regular rhythm and normal heart sounds. Pulmonary/Chest: Effort normal and breath sounds normal.   Abdominal: Soft. There is no tenderness. Musculoskeletal:        Cervical back: He exhibits decreased range of motion (due to pain) and tenderness. He exhibits no bony tenderness, no swelling, no edema and no deformity. Lumbar back: He exhibits decreased range of motion (due to pain), tenderness and bony tenderness. He exhibits no swelling, no edema and no deformity. Neurological: He is alert and oriented to person, place, and time. Skin: Skin is warm and dry. He is not diaphoretic. Psychiatric: His speech is normal and behavior is normal. Thought content normal. His mood appears anxious (nervous about cancer dx).  His affect is not angry. Thought content is not paranoid. He does not exhibit a depressed mood. He expresses no homicidal and no suicidal ideation. ASSESSMENT and PLAN      ICD-10-CM ICD-9-CM    1. Metastatic adenocarcinoma to prostate ([facility] [address] C79.82 198.82 PSA SCREENING (SCREENING)      oxyCODONE IR (ROXICODONE) 5 mg immediate release tablet   2. Mild depression (HCC) F32.0 311 venlafaxine-SR (EFFEXOR-XR) 150 mg capsule   3. Essential tremor G25.0 333.1 PHENOBARBITAL   4. Taking multiple medications for chronic disease R69 799.9 PHENOBARBITAL   5. Malaise P03.21 636.10 METABOLIC PANEL, COMPREHENSIVE      TSH 3RD GENERATION      CBC WITH AUTOMATED DIFF      TESTOSTERONE, FREE & TOTAL      LIPID PANEL      COLLECTION VENOUS BLOOD,VENIPUNCTURE   6. Hx of neck surgery Z98.890 V15.29    7. Closed compression fracture of third lumbar vertebra, sequela S32.030S 905.1 gabapentin (NEURONTIN) 600 mg tablet      oxyCODONE IR (ROXICODONE) 5 mg immediate release tablet   8. Reactive airway disease that is not asthma R09.89 786.9 albuterol (PROVENTIL HFA, VENTOLIN HFA, PROAIR HFA) 90 mcg/actuation inhaler   9. Other closed intra-articular fracture of distal end of right radius, initial encounter S52.775G 813.42 oxyCODONE IR (ROXICODONE) 5 mg immediate release tablet     Follow-up and Dispositions    · Return in about 3 months (around 10/3/2019) for follow up. Pt expressed understanding of visit summary and plans for any follow ups or referrals as well as any medications prescribed. Medicare Wellness Visit  Jayce Lama is a 59 y.o. male and presents for annual Medicare Wellness Visit. Problem List: Reviewed with patient and discussed risk factors.     Patient Active Problem List   Diagnosis Code    Mild depression ([facility] [address] F32.0    Metastatic adenocarcinoma to prostate ([facility] [address] C79.82    Essential tremor G25.0    Closed compression fracture of L3 lumbar vertebra S32.030A    Insomnia G47.00    Anxiety F41.9       Current medical providers:  Patient Care Team:  Michael Blood PA-C as PCP - General (Physician Assistant)    [address]: Reviewed with patient  Past Surgical History:   Procedure Laterality Date    HX CERVICAL DISKECTOMY  2006    HX KYPHOPLASTY  11/2017        SH: Reviewed with patient  Social History     Tobacco Use    Smoking status: Current Every Day Smoker     Packs/day: 0.50     Years: 27.00     Pack years: 13.50    Smokeless tobacco: Never Used   Substance Use Topics    Alcohol use: Yes     Comment: socially    Drug use: Yes     Types: Marijuana     Comment: rarely       FH: Reviewed with patient  Family History   Problem Relation Age of Onset    Hypertension Mother        Medications/Allergies: Reviewed with patient  Current Outpatient Medications on File Prior to Visit   Medication Sig Dispense Refill    amLODIPine-benazepril (LOTREL) 5-10 mg per capsule TAKE 1 CAPSULE EVERY DAY 90 Cap 1    atorvastatin (LIPITOR) 20 mg tablet TAKE 1 TABLET EVERY DAY 90 Tab 1    mirtazapine (REMERON) 15 mg tablet TAKE 1 TABLET EVERY NIGHT 90 Tab 1    omega-3 acid ethyl esters (LOVAZA) 1 gram capsule Take 2 g by mouth two (2) times a day.  leuprolide (ELIGARD, 3 MONTH,) 22.5 mg syrg injection 22.5 mg by SubCUTAneous route once.  PHENobarbital (LUMINAL) 60 mg tablet TAKE 1 TABLET FOUR TIMES DAILY 360 Tab 1    AMLODIPINE BESYLATE (AMLODIPINE PO) Take  by mouth. No current facility-administered medications on file prior to visit. No Known Allergies    Objective:  Visit Vitals  /88 (BP 1 Location: Left arm, BP Patient Position: Sitting)   Pulse 85   Temp 98.1 °F (36.7 °C) (Oral)   Resp 16   Ht 5' 11\" (1.803 m)   Wt 211 lb (95.7 kg)   SpO2 95%   BMI 29.43 kg/m²    Body mass index is 29.43 kg/m².     Assessment of cognitive impairment: Alert and oriented x 3    Depression Screen:   3 most recent PHQ Screens 3/2/2018   Little interest or pleasure in doing things Several days Feeling down, depressed, irritable, or hopeless More than half the days   Total Score PHQ 2 3       Fall Risk Assessment:  No flowsheet data found. Functional Ability:   Does the patient exhibit a steady gait? yes   How long did it take the patient to get up and walk from a sitting position? <10sec   Is the patient self reliant?  (ie can do own laundry, meals, household chores)  yes     Does the patient handle his/her own medications? yes     Does the patient handle his/her own money? yes     Is the patients home safe (ie good lighting, handrails on stairs and bath, etc.)? yes     Did you notice or did patient express any hearing difficulties? no     Did you notice or did patient express any vision difficulties?   no     Were distance and reading eye charts used? yes       Advance Care Planning:   Patient was offered the opportunity to discuss advance care planning:  yes     Does patient have an Advance Directive:  no   If no, did you provide information on Caring Connections?   yes       Plan:      Orders Placed This Encounter    PHENOBARBITAL    METABOLIC PANEL, COMPREHENSIVE    TSH 3RD GENERATION    CBC WITH AUTOMATED DIFF    TESTOSTERONE, FREE & TOTAL    PSA SCREENING (SCREENING)    LIPID PANEL    CBC WITH AUTOMATED DIFF    METABOLIC PANEL, COMPREHENSIVE    LIPID PANEL    TESTOSTERONE, FREE & TOTAL    PHENOBARBITAL    TSH 3RD GENERATION    PSA SCREENING (SCREENING)    COLLECTION VENOUS BLOOD,VENIPUNCTURE    gabapentin (NEURONTIN) 600 mg tablet    albuterol (PROVENTIL HFA, VENTOLIN HFA, PROAIR HFA) 90 mcg/actuation inhaler    venlafaxine-SR (EFFEXOR-XR) 150 mg capsule    oxyCODONE IR (ROXICODONE) 5 mg immediate release tablet       Health Maintenance   Topic Date Due    Hepatitis C Screening  1955    Pneumococcal 0-64 years (1 of 3 - PCV13) 05/23/1961    DTaP/Tdap/Td series (1 - Tdap) 05/23/1976    Shingrix Vaccine Age 50> (1 of 2) 05/23/2005    FOBT Q 1 YEAR AGE 50-75 05/23/2005    Influenza Age 9 to Adult  08/01/2019    MEDICARE YEARLY EXAM  07/03/2020       *Patient verbalized understanding and agreement with the plan. A copy of the After Visit Summary with personalized health plan was given to the patient today.

## 2019-07-12 NOTE — PATIENT INSTRUCTIONS
Prostate Cancer Screening: Care Instructions  Your Care Instructions    The prostate gland is an organ found just below a man's bladder. It is the size and shape of a walnut. It surrounds the tube that carries urine from the bladder out of the body through the penis. This tube is called the urethra. Prostate cancer is the abnormal growth of cells in the prostate. It is the second most common type of cancer in men. (Skin cancer is the most common.)  Most cases of prostate cancer occur in men older than 72. The disease runs in families. And it's more common in -American men. When it's found and treated early, prostate cancer may be cured. But it is not always treated. This is because prostate cancer may not shorten your life, especially if you are older and the cancer is growing slowly. Follow-up care is a key part of your treatment and safety. Be sure to make and go to all appointments, and call your doctor if you are having problems. It's also a good idea to know your test results and keep a list of the medicines you take. What are the screening tests for prostate cancer? The main screening test for prostate cancer is the prostate-specific antigen (PSA) test. This is a blood test that measures how much PSA is in your blood. A high level may mean that you have an enlargement, an infection, or cancer. Along with the PSA test, you may have a digital rectal exam. The digital (finger) rectal exam checks for anything abnormal in your prostate. To do the exam, the doctor puts a lubricated, gloved finger into your rectum. If these tests suggest cancer, you may need a prostate biopsy. How is prostate cancer diagnosed? In a biopsy, the doctor takes small tissue samples from your prostate gland. Another doctor then looks at the tissue under a microscope to see if there are cancer cells, signs of infection, or other problems. The results help diagnose prostate cancer.   What are the pros and cons of screening? Neither a PSA test nor a digital rectal exam can tell you for sure that you do or do not have cancer. But they can help you decide if you need more tests, such as a prostate biopsy. Screening tests may be useful because most men with prostate cancer don't have symptoms. It can be hard to know if you have cancer until it is more advanced. And then it's harder to treat. But having a PSA test can also cause harm. The test may show high levels of PSA that aren't caused by cancer. So you could have a prostate biopsy you didn't need. Or the PSA test might be normal when there is cancer, so a cancer might not be found early. The test can also find cancers that would never have caused a problem during your lifetime. So you might have treatment that was not needed. Prostate cancer usually develops late in life and grows slowly. For many men, it does not shorten their lives. Some experts advise screening only for men who are at high risk. Talk with your doctor to see if screening is right for you. Where can you learn more? Go to http://chris-clarissa.info/. Enter R550 in the search box to learn more about \"Prostate Cancer Screening: Care Instructions. \"  Current as of: March 27, 2018  Content Version: 11.9  © 6730-4677 [employer]. Care instructions adapted under license by [employer] (which disclaims liability or warranty for this information). If you have questions about a medical condition or this instruction, always ask your healthcare professional. [place] [address] any warranty or liability for your use of this information. Schedule of Personalized Health Plan  (Provide Copy to Patient)  The best way to stay healthy is to live a healthy lifestyle. A healthy lifestyle includes regular exercise, eating a well-balanced diet, keeping a healthy weight and not smoking.     Regular physical exams and screening tests are another important way to take care of yourself. Preventive exams provided by health care providers can find health problems early when treatment works best and can keep you from getting certain diseases or illnesses. Preventive services include exams, lab tests, screenings, shots, monitoring and information to help you take care of your own health. All people over 65 should have a pneumonia shot. Pneumonia shots are usually only needed once in a lifetime unless your doctor decides differently. All people over 65 should have a yearly flu shot. People over 65 are at medium to high risk for Hepatitis B. Three shots are needed for complete protection. In addition to your physical exam, some screening tests are recommended:    Bone mass measurement (dexa scan) is recommended every two years if you have certain risk factors, such as personal history of vertebral fracture or chronic steroid medication use    Diabetes Mellitus screening is recommended every year. Glaucoma is an eye disease caused by high pressure in the eye. An eye exam is recommended every year. Cardiovascular screening tests that check your cholesterol and other blood fat (lipid) levels are recommended every five years. Colorectal Cancer screening tests help to find pre-cancerous polyps (growths in the colon) so they can be removed before they turn into cancer. Tests ordered for screening depend on your personal and family history risk factors.     Screening for Prostate Cancer is recommended yearly with a digital rectal exam and/or a PSA test    Here is a list of your current Health Maintenance items with a due date:  Health Maintenance   Topic Date Due    Hepatitis C Screening  1955    Pneumococcal 0-64 years (1 of 3 - PCV13) 05/23/1961    DTaP/Tdap/Td series (1 - Tdap) 05/23/1976    Shingrix Vaccine Age 50> (1 of 2) 05/23/2005    FOBT Q 1 YEAR AGE 50-75  05/23/2005    Influenza Age 5 to Adult  08/01/2019    MEDICARE YEARLY EXAM  07/03/2020

## 2019-07-16 ENCOUNTER — TELEPHONE (OUTPATIENT)
Dept: INTERNAL MEDICINE CLINIC | Age: 64
End: 2019-07-16

## 2019-07-16 DIAGNOSIS — C79.82 METASTATIC ADENOCARCINOMA TO PROSTATE (HCC): Primary | ICD-10-CM

## 2019-07-16 RX ORDER — OXYCODONE HYDROCHLORIDE 5 MG/1
5 TABLET ORAL
Qty: 30 TAB | Refills: 0 | Status: SHIPPED | OUTPATIENT
Start: 2019-07-16 | End: 2019-07-30

## 2019-07-16 NOTE — TELEPHONE ENCOUNTER
Pt walked in to  his Rx and lab results. Pt he is requesting another refill on his oxycodone 5mg. He stated he does not get in to see the orthopedic dr for another 2 weeks.      Patient called requesting refill on Oxycodone 5mg   Patient last appointment was 07/03/18  Patient next appointment is no further appointment, follow up in 3 months   Pt will  rx

## 2019-07-18 ENCOUNTER — TELEPHONE (OUTPATIENT)
Dept: INTERNAL MEDICINE CLINIC | Age: 64
End: 2019-07-18

## 2019-07-18 NOTE — LETTER
8/12/2019 4:28 PM 
 
Mr. [phone] [facility] [phone] Mr. Delarosa Tawana, [address] [facility] office has tried multiple times to contact you but were unsuccessful. Your lab results have come back showing your PSA at 38. Also your Phenobarbital level was low. All other labs were good. There was also a message about a letter for court but she unfortunately is unable to write that since the medication was only for an as needed basis. Let me know if you need the PSA level faxed to anybody and I will take care of it.  
 
 
 
Sincerely, 
 
 
Tye Godinez PA-C

## 2019-07-18 NOTE — TELEPHONE ENCOUNTER
Pts PSA was 38 however I do not have his last level to compare. We can forward labs to his urologist if he gives us info but he should follow up with them. Also his phenobarbital level is low. Is he taking it?

## 2019-07-29 ENCOUNTER — TELEPHONE (OUTPATIENT)
Dept: INTERNAL MEDICINE CLINIC | Age: 64
End: 2019-07-29

## 2019-07-29 NOTE — TELEPHONE ENCOUNTER
Pt calling in requesting a letter stating that due to the fact he is on oxycodone for shoulder and elbow pain he is unable to drive to [place] for his court date; pt is requesting letter today.   I did advise pt that it can take up to 34-72

## 2019-08-01 ENCOUNTER — TELEPHONE (OUTPATIENT)
Dept: INTERNAL MEDICINE CLINIC | Age: 64
End: 2019-08-01

## 2019-08-01 DIAGNOSIS — C79.82 METASTATIC ADENOCARCINOMA TO PROSTATE (HCC): Primary | ICD-10-CM

## 2019-08-01 DIAGNOSIS — F41.9 ANXIETY: ICD-10-CM

## 2019-08-01 DIAGNOSIS — G47.00 INSOMNIA, UNSPECIFIED TYPE: ICD-10-CM

## 2019-08-01 RX ORDER — ALPRAZOLAM 0.5 MG/1
0.5 TABLET ORAL
Qty: 270 TAB | Refills: 0 | Status: SHIPPED | OUTPATIENT
Start: 2019-08-01 | End: 2019-10-30

## 2019-08-01 NOTE — TELEPHONE ENCOUNTER
Robert calling saying pt was told you were switching him from clonazepam to alprazolam. They just wanted to check and make sure that is the case, and if so, please switch.

## 2019-08-01 NOTE — TELEPHONE ENCOUNTER
Pt does not take med daily only as needed and uncertain as to how that would keep him from having to go to court

## 2019-08-14 ENCOUNTER — PATIENT OUTREACH (OUTPATIENT)
Dept: INTERNAL MEDICINE CLINIC | Age: 64
End: 2019-08-14

## 2019-08-26 VITALS
HEART RATE: 85 BPM | HEIGHT: 71 IN | TEMPERATURE: 98.1 F | DIASTOLIC BLOOD PRESSURE: 88 MMHG | RESPIRATION RATE: 16 BRPM | WEIGHT: 211 LBS | OXYGEN SATURATION: 95 % | BODY MASS INDEX: 29.54 KG/M2 | SYSTOLIC BLOOD PRESSURE: 136 MMHG

## 2019-08-27 DIAGNOSIS — G47.00 INSOMNIA, UNSPECIFIED TYPE: ICD-10-CM

## 2019-08-28 RX ORDER — VENLAFAXINE 100 MG/1
TABLET ORAL
Qty: 270 TAB | Refills: 1 | Status: SHIPPED | OUTPATIENT
Start: 2019-08-28 | End: 2020-09-09 | Stop reason: DRUGHIGH

## 2019-08-28 RX ORDER — MIRTAZAPINE 15 MG/1
TABLET, FILM COATED ORAL
Qty: 90 TAB | Refills: 1 | Status: SHIPPED | OUTPATIENT
Start: 2019-08-28 | End: 2020-08-24 | Stop reason: SDUPTHER

## 2019-12-13 DIAGNOSIS — S32.030S CLOSED COMPRESSION FRACTURE OF THIRD LUMBAR VERTEBRA, SEQUELA: ICD-10-CM

## 2019-12-15 RX ORDER — ALPRAZOLAM 0.5 MG/1
TABLET ORAL
Qty: 90 TAB | Refills: 0 | Status: SHIPPED | OUTPATIENT
Start: 2019-12-15 | End: 2020-01-24

## 2019-12-15 RX ORDER — GABAPENTIN 600 MG/1
TABLET ORAL
Qty: 90 TAB | Refills: 0 | Status: SHIPPED | OUTPATIENT
Start: 2019-12-15 | End: 2020-01-24

## 2019-12-18 RX ORDER — AMLODIPINE AND BENAZEPRIL HYDROCHLORIDE 5; 10 MG/1; MG/1
CAPSULE ORAL
Qty: 90 CAP | Refills: 0 | OUTPATIENT
Start: 2019-12-18

## 2020-01-30 ENCOUNTER — OFFICE VISIT (OUTPATIENT)
Dept: FAMILY MEDICINE CLINIC | Facility: CLINIC | Age: 65
End: 2020-01-30

## 2020-01-30 VITALS
DIASTOLIC BLOOD PRESSURE: 88 MMHG | WEIGHT: 215 LBS | HEIGHT: 71 IN | SYSTOLIC BLOOD PRESSURE: 138 MMHG | HEART RATE: 96 BPM | BODY MASS INDEX: 30.1 KG/M2 | RESPIRATION RATE: 17 BRPM | TEMPERATURE: 96.9 F | OXYGEN SATURATION: 97 %

## 2020-01-30 DIAGNOSIS — J98.9 REACTIVE AIRWAY DISEASE THAT IS NOT ASTHMA: ICD-10-CM

## 2020-01-30 DIAGNOSIS — S32.030S CLOSED COMPRESSION FRACTURE OF THIRD LUMBAR VERTEBRA, SEQUELA: ICD-10-CM

## 2020-01-30 DIAGNOSIS — M81.8 OSTEOPOROSIS DUE TO ANDROGEN THERAPY: ICD-10-CM

## 2020-01-30 DIAGNOSIS — F41.9 ANXIETY: Primary | ICD-10-CM

## 2020-01-30 DIAGNOSIS — G25.0 ESSENTIAL TREMOR: ICD-10-CM

## 2020-01-30 DIAGNOSIS — C79.82 METASTATIC ADENOCARCINOMA TO PROSTATE (HCC): ICD-10-CM

## 2020-01-30 DIAGNOSIS — Z12.83 SKIN CANCER SCREENING: ICD-10-CM

## 2020-01-30 DIAGNOSIS — T38.7X5A OSTEOPOROSIS DUE TO ANDROGEN THERAPY: ICD-10-CM

## 2020-01-30 DIAGNOSIS — E78.00 HYPERCHOLESTEROLEMIA: ICD-10-CM

## 2020-01-30 LAB
BILIRUB UR QL STRIP: NEGATIVE
GLUCOSE UR-MCNC: NEGATIVE MG/DL
KETONES P FAST UR STRIP-MCNC: NEGATIVE MG/DL
PH UR STRIP: 5.5 [PH] (ref 4.6–8)
PROT UR QL STRIP: NEGATIVE
SP GR UR STRIP: 1.02 (ref 1–1.03)
UA UROBILINOGEN AMB POC: NORMAL (ref 0.2–1)
URINALYSIS CLARITY POC: CLEAR
URINALYSIS COLOR POC: YELLOW
URINE BLOOD POC: NEGATIVE
URINE LEUKOCYTES POC: NEGATIVE
URINE NITRITES POC: NEGATIVE

## 2020-01-30 RX ORDER — LORAZEPAM 1 MG/1
1 TABLET ORAL
Qty: 90 TAB | Refills: 3 | Status: SHIPPED | OUTPATIENT
Start: 2020-01-30 | End: 2020-05-31

## 2020-01-30 RX ORDER — PHENOBARBITAL 60 MG/1
TABLET ORAL
Qty: 120 TAB | Refills: 3 | Status: SHIPPED | OUTPATIENT
Start: 2020-01-30 | End: 2020-07-13

## 2020-01-30 RX ORDER — GABAPENTIN 600 MG/1
600 TABLET ORAL 3 TIMES DAILY
Qty: 90 TAB | Refills: 3 | Status: SHIPPED | OUTPATIENT
Start: 2020-01-30 | End: 2020-07-13

## 2020-01-30 RX ORDER — TAMSULOSIN HYDROCHLORIDE 0.4 MG/1
0.4 CAPSULE ORAL DAILY
Qty: 30 CAP | Refills: 3 | Status: SHIPPED | OUTPATIENT
Start: 2020-01-30 | End: 2020-05-20

## 2020-01-30 RX ORDER — ALBUTEROL SULFATE 90 UG/1
2 AEROSOL, METERED RESPIRATORY (INHALATION)
Qty: 1 INHALER | Refills: 1 | Status: SHIPPED | OUTPATIENT
Start: 2020-01-30 | End: 2021-07-29

## 2020-01-30 NOTE — PROGRESS NOTES
HISTORY OF PRESENT ILLNESS  Princess Reyes is a 59 y.o. male. HPI   Pt is here for f/u on his chol, htn., insomnia, and anxiety/depression. He is doing well on meds and has been seeing oncology although he would like to see a different oncologist. He has been going to [facility] and has not been as happy there. His anxiety is doing well on meds and he does not need ativan as much. He has osteoporosis from his cancer treatment and cannot take fosamax of any of those meds. He has never tried Prolia    No Known Allergies    Current Outpatient Medications   Medication Sig    leuprolide subcutaneous (LUPRON) 1 mg/0.2 mL injection by SubCUTAneous route.  LORazepam (ATIVAN) 1 mg tablet Take 1 Tab by mouth every eight (8) hours as needed for Anxiety. Max Daily Amount: 3 mg.  albuterol (PROVENTIL HFA, VENTOLIN HFA, PROAIR HFA) 90 mcg/actuation inhaler Take 2 Puffs by inhalation every six (6) hours as needed for Wheezing.  PHENobarbital (LUMINAL) 60 mg tablet TAKE 1 TABLET BY MOUTH FOUR TIMES DAILY    gabapentin (NEURONTIN) 600 mg tablet Take 1 Tab by mouth three (3) times daily. Max Daily Amount: 1,800 mg.  tamsulosin (FLOMAX) 0.4 mg capsule Take 1 Cap by mouth daily.  amLODIPine-benazepril (LOTREL) 5-10 mg per capsule TAKE 1 CAPSULE EVERY DAY    venlafaxine (EFFEXOR) 100 mg tablet TAKE 1 TABLET THREE TIMES DAILY    mirtazapine (REMERON) 15 mg tablet TAKE 1 TABLET EVERY NIGHT    atorvastatin (LIPITOR) 20 mg tablet TAKE 1 TABLET EVERY DAY    omega-3 acid ethyl esters (LOVAZA) 1 gram capsule Take 2 g by mouth two (2) times a day. No current facility-administered medications for this visit. Review of Systems   Constitutional: Positive for malaise/fatigue. Negative for chills and fever. HENT: Positive for congestion. Negative for ear pain, sore throat and tinnitus. Eyes: Negative. Negative for blurred vision, double vision and photophobia.    Respiratory: Positive for cough and sputum production (clear). Negative for shortness of breath and wheezing. Cardiovascular: Negative. Negative for chest pain, palpitations and leg swelling. Gastrointestinal: Negative. Negative for abdominal pain, heartburn, nausea and vomiting. Genitourinary: Negative. Negative for dysuria, frequency, hematuria and urgency. Musculoskeletal: Positive for back pain (chronic) and neck pain. Negative for joint pain and myalgias. Skin: Negative. Negative for itching and rash. Neurological: Positive for tremors and headaches. Negative for dizziness and tingling. Psychiatric/Behavioral: Positive for depression. Negative for memory loss, substance abuse and suicidal ideas. The patient is nervous/anxious and has insomnia (due to anxiety). Visit Vitals  /88 (BP 1 Location: Right arm, BP Patient Position: Sitting)   Pulse 96   Temp 96.9 °F (36.1 °C) (Oral)   Resp 17   Ht 5' 11\" (1.803 m)   Wt 215 lb (97.5 kg)   SpO2 97%   BMI 29.99 kg/m²         Physical Exam  Constitutional:       General: He is not in acute distress. Appearance: He is well-developed. He is not diaphoretic. HENT:      Head: Normocephalic and atraumatic. Cardiovascular:      Rate and Rhythm: Normal rate and regular rhythm. Heart sounds: Normal heart sounds. Pulmonary:      Effort: Pulmonary effort is normal.      Breath sounds: Normal breath sounds. Abdominal:      Palpations: Abdomen is soft. Tenderness: There is no abdominal tenderness. Musculoskeletal:      Cervical back: He exhibits decreased range of motion (due to pain) and tenderness. He exhibits no bony tenderness, no swelling, no edema and no deformity. Lumbar back: He exhibits decreased range of motion (due to pain), tenderness and bony tenderness. He exhibits no swelling, no edema and no deformity. Skin:     General: Skin is warm and dry. Neurological:      Mental Status: He is alert and oriented to person, place, and time.    Psychiatric: Mood and Affect: Mood normal. Mood is not anxious or depressed. Affect is not angry. Speech: Speech normal.         Behavior: Behavior normal.         Thought Content: Thought content normal. Thought content is not paranoid. Thought content does not include homicidal or suicidal ideation. ASSESSMENT and PLAN    ICD-10-CM ICD-9-CM    1. Anxiety F41.9 300.00 LORazepam (ATIVAN) 1 mg tablet      TSH 3RD GENERATION   2. Reactive airway disease that is not asthma R09.89 786.9 albuterol (PROVENTIL HFA, VENTOLIN HFA, PROAIR HFA) 90 mcg/actuation inhaler   3. Essential tremor G25.0 333.1 PHENobarbital (LUMINAL) 60 mg tablet   4. Closed compression fracture of third lumbar vertebra, sequela S32.030S 905.1 gabapentin (NEURONTIN) 600 mg tablet   5. Skin cancer screening Z12.83 V76.43 REFERRAL TO DERMATOLOGY   6. Metastatic adenocarcinoma to prostate (HCC) C79.82 198.82 AMB POC URINALYSIS DIP STICK AUTO W/O MICRO      CBC WITH AUTOMATED DIFF      PSA SCREENING (SCREENING)      tamsulosin (FLOMAX) 0.4 mg capsule      denosumab (PROLIA) 60 mg/mL injection      REFERRAL TO ONCOLOGY   7. Hypercholesterolemia G13.18 763.4 METABOLIC PANEL, COMPREHENSIVE      LIPID PANEL   8. 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 Pt expressed understanding of visit summary and plans for any follow ups or referrals as well as any medications prescribed.

## 2020-01-30 NOTE — PROGRESS NOTES
Chief Complaint   Patient presents with    Cholesterol Problem    Depression    Anxiety   1. Have you been to the ER, urgent care clinic since your last visit? Hospitalized since your last visit? No    2. Have you seen or consulted any other health care providers outside of the [address] [place] [address] since your last visit? Include any pap smears or colon screening.  No

## 2020-02-02 RX ORDER — LEUPROLIDE ACETATE 1 MG/0.2ML
KIT SUBCUTANEOUS
COMMUNITY
End: 2021-03-02

## 2020-02-03 NOTE — PATIENT INSTRUCTIONS

## 2020-05-19 DIAGNOSIS — C79.82 METASTATIC ADENOCARCINOMA TO PROSTATE (HCC): ICD-10-CM

## 2020-05-20 RX ORDER — TAMSULOSIN HYDROCHLORIDE 0.4 MG/1
CAPSULE ORAL
Qty: 30 CAP | Refills: 3 | Status: SHIPPED | OUTPATIENT
Start: 2020-05-20 | End: 2021-07-14

## 2020-05-27 DIAGNOSIS — F41.9 ANXIETY: ICD-10-CM

## 2020-05-31 RX ORDER — LORAZEPAM 1 MG/1
TABLET ORAL
Qty: 90 TAB | Refills: 0 | Status: SHIPPED | OUTPATIENT
Start: 2020-05-31 | End: 2020-07-22

## 2020-09-09 ENCOUNTER — VIRTUAL VISIT (OUTPATIENT)
Dept: FAMILY MEDICINE CLINIC | Facility: CLINIC | Age: 65
End: 2020-09-09

## 2020-09-09 ENCOUNTER — TELEPHONE (OUTPATIENT)
Dept: FAMILY MEDICINE CLINIC | Facility: CLINIC | Age: 65
End: 2020-09-09

## 2020-09-09 DIAGNOSIS — C79.82 METASTATIC ADENOCARCINOMA TO PROSTATE (HCC): Primary | ICD-10-CM

## 2020-09-09 DIAGNOSIS — G47.00 INSOMNIA, UNSPECIFIED TYPE: ICD-10-CM

## 2020-09-09 DIAGNOSIS — Z00.00 ENCOUNTER FOR MEDICARE ANNUAL WELLNESS EXAM: ICD-10-CM

## 2020-09-09 DIAGNOSIS — F41.9 ANXIETY: Primary | ICD-10-CM

## 2020-09-09 DIAGNOSIS — R60.0 EDEMA, LOWER EXTREMITY: ICD-10-CM

## 2020-09-09 DIAGNOSIS — E78.00 HYPERCHOLESTEROLEMIA: ICD-10-CM

## 2020-09-09 DIAGNOSIS — Z79.899 HIGH RISK MEDICATION USE: ICD-10-CM

## 2020-09-09 DIAGNOSIS — C79.82 METASTATIC ADENOCARCINOMA TO PROSTATE (HCC): ICD-10-CM

## 2020-09-09 RX ORDER — FUROSEMIDE 20 MG/1
20 TABLET ORAL 2 TIMES DAILY
Qty: 60 TAB | Refills: 0 | Status: SHIPPED | OUTPATIENT
Start: 2020-09-09 | End: 2021-03-02

## 2020-09-09 RX ORDER — POTASSIUM CHLORIDE 20 MEQ/1
20 TABLET, EXTENDED RELEASE ORAL 2 TIMES DAILY
Qty: 30 TAB | Refills: 0 | Status: SHIPPED | OUTPATIENT
Start: 2020-09-09 | End: 2021-03-02

## 2020-09-09 RX ORDER — VENLAFAXINE 100 MG/1
TABLET ORAL
Qty: 270 TAB | Refills: 1 | Status: SHIPPED | OUTPATIENT
Start: 2020-09-09 | End: 2021-04-27 | Stop reason: SDUPTHER

## 2020-09-09 RX ORDER — CLONAZEPAM 0.5 MG/1
0.5 TABLET ORAL 4 TIMES DAILY
Qty: 120 TAB | Refills: 2 | Status: SHIPPED | OUTPATIENT
Start: 2020-09-09 | End: 2021-02-23 | Stop reason: SDUPTHER

## 2020-09-09 NOTE — TELEPHONE ENCOUNTER
Patient is requesting to have a pain medication sent in for his cancer pain. The advil & aleeve are not helping and he is taking too many of them.

## 2020-09-15 RX ORDER — OXYCODONE AND ACETAMINOPHEN 5; 325 MG/1; MG/1
1 TABLET ORAL
Qty: 60 TAB | Refills: 0 | Status: SHIPPED | OUTPATIENT
Start: 2020-09-15 | End: 2020-10-15

## 2020-09-15 RX ORDER — NALOXONE HYDROCHLORIDE 4 MG/.1ML
SPRAY NASAL
Qty: 1 EACH | Refills: 0 | Status: SHIPPED | OUTPATIENT
Start: 2020-09-15 | End: 2021-03-02

## 2020-09-22 NOTE — PATIENT INSTRUCTIONS
Schedule of Personalized Health Plan (Provide Copy to Patient) The best way to stay healthy is to live a healthy lifestyle. A healthy lifestyle includes regular exercise, eating a well-balanced diet, keeping a healthy weight and not smoking. Regular physical exams and screening tests are another important way to take care of yourself. Preventive exams provided by health care providers can find health problems early when treatment works best and can keep you from getting certain diseases or illnesses. Preventive services include exams, lab tests, screenings, shots, monitoring and information to help you take care of your own health. All people over 65 should have a pneumonia shot. Pneumonia shots are usually only needed once in a lifetime unless your doctor decides differently. All people over 65 should have a yearly flu shot. People over 65 are at medium to high risk for Hepatitis B. Three shots are needed for complete protection. In addition to your physical exam, some screening tests are recommended: 
 
Bone mass measurement (dexa scan) is recommended every two years if you have certain risk factors, such as personal history of vertebral fracture or chronic steroid medication use Diabetes Mellitus screening is recommended every year. Glaucoma is an eye disease caused by high pressure in the eye. An eye exam is recommended every year. Cardiovascular screening tests that check your cholesterol and other blood fat (lipid) levels are recommended every five years. Colorectal Cancer screening tests help to find pre-cancerous polyps (growths in the colon) so they can be removed before they turn into cancer. Tests ordered for screening depend on your personal and family history risk factors. Screening for Prostate Cancer is recommended yearly with a digital rectal exam and/or a PSA test 
 
Here is a list of your current Health Maintenance items with a due date: 
Health Maintenance Topic Date Due  
 Hepatitis C Screening  1955  DTaP/Tdap/Td series (1 - Tdap) 05/23/1976  Shingrix Vaccine Age 50> (1 of 2) 05/23/2005  FOBT Q1Y Age 54-65  05/23/2005  GLAUCOMA SCREENING Q2Y  05/23/2020  AAA Screening 73-67 YO Male Smoking Patients  05/23/2020  Pneumococcal 65+ years (1 of 1 - PPSV23) 05/23/2020  Pneumococcal 65+ yrs at Risk Vaccine (1 of 2 - PCV13) 05/23/2020  Lipid Screen  07/03/2020  Flu Vaccine (1) 09/01/2020  Medicare Yearly Exam  09/10/2021 Pt will discuss immunizations and what is needed with oncologist

## 2020-09-22 NOTE — PROGRESS NOTES
HISTORY OF PRESENT ILLNESS  Jose Guzmán is a 72 y.o. male. HPI   Pt is being seen via doxy. me for his medicare wellness and for f/u on his anxiety/depression. He has seen oncology for his metastatic prostate cancer and he know has new mets in his pelvis and lungs. They are going to discuss what options he has next week. He is continuing to have back and joint pain (likely worsened by his mets). He would like a refill on his klonopin for his anxiety. He does not feel the effexor xr works as well ans would like to change back to the plain effexor tid. He needs a refill on lasix for swelling and has f/u with cardiology next month. He has labs at oncology and will forward us results. No Known Allergies    Current Outpatient Medications   Medication Sig    venlafaxine (EFFEXOR) 100 mg tablet TAKE 1 TABLET THREE TIMES DAILY    clonazePAM (KlonoPIN) 0.5 mg tablet Take 1 Tab by mouth four (4) times daily. Max Daily Amount: 2 mg.  furosemide (LASIX) 20 mg tablet Take 1 Tab by mouth two (2) times a day.  potassium chloride (K-DUR, KLOR-CON) 20 mEq tablet Take 1 Tab by mouth two (2) times a day.  oxyCODONE-acetaminophen (PERCOCET) 5-325 mg per tablet Take 1 Tab by mouth every eight (8) hours as needed for Pain for up to 30 days. Max Daily Amount: 3 Tabs.  naloxone (NARCAN) 4 mg/actuation nasal spray Use 1 spray intranasally, then discard. Repeat with new spray every 2 min as needed for opioid overdose symptoms, alternating nostrils.  mirtazapine (REMERON) 15 mg tablet TAKE 1 TABLET EVERY NIGHT    gabapentin (NEURONTIN) 600 mg tablet TAKE 1 TABLET BY MOUTH THREE TIMES DAILY    PHENobarbitaL (LUMINAL) 60 mg tablet TAKE 1 TABLET BY MOUTH FOUR TIMES DAILY    tamsulosin (FLOMAX) 0.4 mg capsule TAKE ONE CAPSULE BY MOUTH DAILY    leuprolide subcutaneous (LUPRON) 1 mg/0.2 mL injection by SubCUTAneous route.     albuterol (PROVENTIL HFA, VENTOLIN HFA, PROAIR HFA) 90 mcg/actuation inhaler Take 2 Puffs by inhalation every six (6) hours as needed for Wheezing.  amLODIPine-benazepril (LOTREL) 5-10 mg per capsule TAKE 1 CAPSULE EVERY DAY    atorvastatin (LIPITOR) 20 mg tablet TAKE 1 TABLET EVERY DAY    omega-3 acid ethyl esters (LOVAZA) 1 gram capsule Take 2 g by mouth two (2) times a day. No current facility-administered medications for this visit. Review of Systems   Constitutional: Positive for malaise/fatigue. Negative for chills and fever. HENT: Negative. Negative for congestion, ear pain, sore throat and tinnitus. Eyes: Negative. Negative for blurred vision, double vision and photophobia. Respiratory: Negative for cough, shortness of breath and wheezing. Cardiovascular: Negative. Negative for chest pain, palpitations and leg swelling. Gastrointestinal: Negative. Negative for abdominal pain, heartburn, nausea and vomiting. Genitourinary: Negative. Negative for dysuria, frequency, hematuria and urgency. Musculoskeletal: Positive for back pain (chronic), joint pain and neck pain. Negative for myalgias. Skin: Negative. Negative for itching and rash. Neurological: Positive for tremors. Negative for dizziness, tingling and headaches. Psychiatric/Behavioral: Positive for depression (improved on meds). Negative for memory loss, substance abuse and suicidal ideas. The patient is nervous/anxious and has insomnia (due to anxiety). Physical Exam  Constitutional:       General: He is not in acute distress. Appearance: Normal appearance. He is not ill-appearing. HENT:      Head: Normocephalic and atraumatic. Pulmonary:      Effort: No respiratory distress. Neurological:      Mental Status: He is alert and oriented to person, place, and time. Psychiatric:         Mood and Affect: Mood normal.         Behavior: Behavior normal.         Thought Content: Thought content normal.         Judgment: Judgment normal.         ASSESSMENT and PLAN    ICD-10-CM ICD-9-CM    1. Anxiety  F41.9 300.00 venlafaxine (EFFEXOR) 100 mg tablet      clonazePAM (KlonoPIN) 0.5 mg tablet   2. Edema, lower extremity  R60.0 782.3 furosemide (LASIX) 20 mg tablet      potassium chloride (K-DUR, KLOR-CON) 20 mEq tablet   3. Insomnia, unspecified type  G47.00 780.52    4. Hypercholesterolemia  E78.00 272.0    5. Metastatic adenocarcinoma to prostate ([facility] [address]  C79.82 198.82    6. Encounter for Medicare annual wellness exam  Z00.00 V70.0      Follow-up and Dispositions    · Return in about 3 months (around 12/9/2020) for follow up. Pt expressed understanding of visit summary and plans for any follow ups or referrals as well as any medications prescribed. Seen by synchronous (real-time) audio-video technology via doxy. me on 09/09/2020    The patient is aware that this patient-initiated Telehealth encounter is a billable service, with coverage as determined by his or her insurance carrier. He/She is aware that they may receive a bill and has provided verbal consent to proceed: Yes        I was in the office while conducting this encounter. Medicare Wellness Visit  Jah Bates is a 72 y.o. male and presents for annual Medicare Wellness Visit. Problem List: Reviewed with patient and discussed risk factors.     Patient Active Problem List   Diagnosis Code    Mild depression ([facility] [address] F32.0    Metastatic adenocarcinoma to prostate ([facility] [address] C79.82    Essential tremor G25.0    Closed compression fracture of L3 lumbar vertebra S32.030A    Insomnia G47.00    Anxiety F41.9       Current medical providers:  Patient Care Team:  Selina Soulier, PA-C as PCP - General (Physician Assistant)  Selina Soulier, PA-C as PCP - Miguel Houser Provider    [address]: Reviewed with patient  Past Surgical History:   Procedure Laterality Date    HX CERVICAL DISKECTOMY  2006    HX KYPHOPLASTY  11/2017        SH: Reviewed with patient  Social History     Tobacco Use    Smoking status: Current Every Day Smoker     Packs/day: 0.50     Years: 27.00     Pack years: 13.50    Smokeless tobacco: Never Used   Substance Use Topics    Alcohol use: Yes     Comment: socially    Drug use: Yes     Types: Marijuana     Comment: rarely       FH: Reviewed with patient  Family History   Problem Relation Age of Onset    Hypertension Mother        Medications/Allergies: Reviewed with patient  Current Outpatient Medications on File Prior to Visit   Medication Sig Dispense Refill    mirtazapine (REMERON) 15 mg tablet TAKE 1 TABLET EVERY NIGHT 30 Tab 0    [DISCONTINUED] tamsulosin (FLOMAX) 0.4 mg capsule Take 2 Caps by mouth daily (after dinner). 180 Cap 3    gabapentin (NEURONTIN) 600 mg tablet TAKE 1 TABLET BY MOUTH THREE TIMES DAILY 90 Tab 2    PHENobarbitaL (LUMINAL) 60 mg tablet TAKE 1 TABLET BY MOUTH FOUR TIMES DAILY 120 Tab 2    tamsulosin (FLOMAX) 0.4 mg capsule TAKE ONE CAPSULE BY MOUTH DAILY 30 Cap 3    leuprolide subcutaneous (LUPRON) 1 mg/0.2 mL injection by SubCUTAneous route.  albuterol (PROVENTIL HFA, VENTOLIN HFA, PROAIR HFA) 90 mcg/actuation inhaler Take 2 Puffs by inhalation every six (6) hours as needed for Wheezing. 1 Inhaler 1    amLODIPine-benazepril (LOTREL) 5-10 mg per capsule TAKE 1 CAPSULE EVERY DAY 90 Cap 1    atorvastatin (LIPITOR) 20 mg tablet TAKE 1 TABLET EVERY DAY 90 Tab 1    omega-3 acid ethyl esters (LOVAZA) 1 gram capsule Take 2 g by mouth two (2) times a day. No current facility-administered medications on file prior to visit. No Known Allergies    Objective: There were no vitals taken for this visit. There is no height or weight on file to calculate BMI.     Assessment of cognitive impairment: Alert and oriented x 3    Depression Screen:   3 most recent PHQ Screens 3/2/2018   Little interest or pleasure in doing things Several days   Feeling down, depressed, irritable, or hopeless More than half the days   Total Score PHQ 2 3       Fall Risk Assessment:    Fall Risk Assessment, last 12 mths 9/22/2020   Able to walk? Yes   Fall in past 12 months? No       Functional Ability:   Does the patient exhibit a steady gait? yes   How long did it take the patient to get up and walk from a sitting position? <10sec   Is the patient self reliant?  (ie can do own laundry, meals, household chores)  yes     Does the patient handle his/her own medications? yes     Does the patient handle his/her own money? yes     Is the patients home safe (ie good lighting, handrails on stairs and bath, etc.)? yes     Did you notice or did patient express any hearing difficulties? no     Did you notice or did patient express any vision difficulties?   no     Were distance and reading eye charts used? No - was VV       Advance Care Planning:   Patient was offered the opportunity to discuss advance care planning:  yes     Does patient have an Advance Directive:  no   If no, did you provide information on C[facility] Connections? yes       Plan:      Orders Placed This Encounter    venlafaxine (EFFEXOR) 100 mg tablet    clonazePAM (KlonoPIN) 0.5 mg tablet    furosemide (LASIX) 20 mg tablet    potassium chloride (K-DUR, KLOR-CON) 20 mEq tablet       Health Maintenance   Topic Date Due    Hepatitis C Screening  1955    DTaP/Tdap/Td series (1 - Tdap) 05/23/1976    Shingrix Vaccine Age 50> (1 of 2) 05/23/2005    FOBT Q1Y Age 54-65  05/23/2005    GLAUCOMA SCREENING Q2Y  05/23/2020    AAA Screening 73-69 YO Male Smoking Patients  05/23/2020    Pneumococcal 65+ years (1 of 1 - PPSV23) 05/23/2020    Pneumococcal 65+ yrs at Risk Vaccine (1 of 2 - PCV13) 05/23/2020    Lipid Screen  07/03/2020    Flu Vaccine (1) 09/01/2020    Medicare Yearly Exam  09/10/2021       *Patient verbalized understanding and agreement with the plan. A copy of the After Visit Summary with personalized health plan was given to the patient today.

## 2020-09-25 ENCOUNTER — TELEPHONE (OUTPATIENT)
Dept: INTERNAL MEDICINE CLINIC | Age: 65
End: 2020-09-25

## 2020-09-26 NOTE — TELEPHONE ENCOUNTER
Pt called with complaint of more difficulty voiding, abdominal pain, back pain, and constipation  He missed his last OV with his PCP. He has prostate cancer and voiding issues going back to July. He is on flomax which was doubled in July but his sxs continue to worsen. He had a post void residual of 226 in July. He describes dribbling and incomplete emptying. He has orders for a CT and bone scan from July that have not been done yet due to a problem with insurance coverage. He does not want to go to the ER. But I advised him that we can not help him over the phone. He may need a urinary catheter and he needs some lab to understand what is going on. He asked for pain medication as he is uncomfortable. I noted that he had an RX for percocet from 9/15/20 for 60 tablets. I advised him that our policy is no opiates on nights and weekends. These must be obtained from his PCP. I again advised him for his health and to properly evaluate his symptoms, I am advising him to go to the ER.

## 2020-12-17 DIAGNOSIS — G47.00 INSOMNIA, UNSPECIFIED TYPE: ICD-10-CM

## 2020-12-17 DIAGNOSIS — G25.0 ESSENTIAL TREMOR: ICD-10-CM

## 2020-12-17 DIAGNOSIS — S32.030S CLOSED COMPRESSION FRACTURE OF L3 VERTEBRA, SEQUELA: ICD-10-CM

## 2020-12-17 RX ORDER — GABAPENTIN 600 MG/1
TABLET ORAL
Qty: 90 TAB | Refills: 0 | Status: SHIPPED | OUTPATIENT
Start: 2020-12-17 | End: 2021-02-23 | Stop reason: SDUPTHER

## 2020-12-17 RX ORDER — MIRTAZAPINE 15 MG/1
TABLET, FILM COATED ORAL
Qty: 30 TAB | Refills: 0 | Status: SHIPPED | OUTPATIENT
Start: 2020-12-17 | End: 2021-04-27 | Stop reason: SDUPTHER

## 2020-12-17 RX ORDER — PHENOBARBITAL 60 MG/1
TABLET ORAL
Qty: 120 TAB | Refills: 0 | Status: SHIPPED | OUTPATIENT
Start: 2020-12-17 | End: 2021-02-23 | Stop reason: SDUPTHER

## 2020-12-17 NOTE — TELEPHONE ENCOUNTER
Patient was in the hospital and scheduled for the 23rd .  He says if you can fill these meds til then please

## 2021-02-23 DIAGNOSIS — G25.0 ESSENTIAL TREMOR: ICD-10-CM

## 2021-02-23 DIAGNOSIS — F41.9 ANXIETY: ICD-10-CM

## 2021-02-23 DIAGNOSIS — S32.030S CLOSED COMPRESSION FRACTURE OF L3 VERTEBRA, SEQUELA: ICD-10-CM

## 2021-02-25 ENCOUNTER — VIRTUAL VISIT (OUTPATIENT)
Dept: FAMILY MEDICINE CLINIC | Age: 66
End: 2021-02-25
Payer: MEDICARE

## 2021-02-25 DIAGNOSIS — E78.00 HYPERCHOLESTEROLEMIA: ICD-10-CM

## 2021-02-25 DIAGNOSIS — F32.A MILD DEPRESSION: ICD-10-CM

## 2021-02-25 DIAGNOSIS — S32.030S CLOSED COMPRESSION FRACTURE OF L3 VERTEBRA, SEQUELA: ICD-10-CM

## 2021-02-25 DIAGNOSIS — F41.9 ANXIETY: ICD-10-CM

## 2021-02-25 DIAGNOSIS — C79.82 METASTATIC ADENOCARCINOMA TO PROSTATE (HCC): Primary | ICD-10-CM

## 2021-02-25 PROCEDURE — 3017F COLORECTAL CA SCREEN DOC REV: CPT | Performed by: PHYSICIAN ASSISTANT

## 2021-02-25 PROCEDURE — G8427 DOCREV CUR MEDS BY ELIG CLIN: HCPCS | Performed by: PHYSICIAN ASSISTANT

## 2021-02-25 PROCEDURE — 99214 OFFICE O/P EST MOD 30 MIN: CPT | Performed by: PHYSICIAN ASSISTANT

## 2021-02-25 PROCEDURE — G9717 DOC PT DX DEP/BP F/U NT REQ: HCPCS | Performed by: PHYSICIAN ASSISTANT

## 2021-02-25 PROCEDURE — 1101F PT FALLS ASSESS-DOCD LE1/YR: CPT | Performed by: PHYSICIAN ASSISTANT

## 2021-02-25 RX ORDER — GABAPENTIN 600 MG/1
TABLET ORAL
Qty: 90 TAB | Refills: 0 | Status: SHIPPED | OUTPATIENT
Start: 2021-02-25 | End: 2021-04-27 | Stop reason: SDUPTHER

## 2021-02-25 RX ORDER — AMLODIPINE BESYLATE 5 MG/1
TABLET ORAL
COMMUNITY
Start: 2021-02-07 | End: 2021-04-27 | Stop reason: SDUPTHER

## 2021-02-25 RX ORDER — PHENOBARBITAL 60 MG/1
TABLET ORAL
Qty: 120 TAB | Refills: 0 | Status: SHIPPED | OUTPATIENT
Start: 2021-02-25 | End: 2021-04-27 | Stop reason: SDUPTHER

## 2021-02-25 RX ORDER — CLONAZEPAM 0.5 MG/1
0.5 TABLET ORAL 4 TIMES DAILY
Qty: 120 TAB | Refills: 0 | Status: SHIPPED | OUTPATIENT
Start: 2021-02-25 | End: 2021-04-27 | Stop reason: SDUPTHER

## 2021-02-25 RX ORDER — HYDROMORPHONE HYDROCHLORIDE 2 MG/1
2 TABLET ORAL
Qty: 28 TAB | Refills: 0 | Status: SHIPPED | OUTPATIENT
Start: 2021-02-25 | End: 2021-03-04

## 2021-02-25 NOTE — PROGRESS NOTES
HISTORY OF PRESENT ILLNESS  Yosi Zhang is a 72 y.o. male. HPI   Pt is being seen via doxy. me for follpow up. He was seen at the ER for angioedema a few weeks ago and taken off lisinopril. His PSA was also increasing so he has a follow up with oncology next week. He is having increased urination but there is no infection and his back pain is worsening. They are concerned about mets. Pt is out of pain meds and his back pain is worsening. He is asking for meds until he sees oncology. Allergies   Allergen Reactions    Ace Inhibitors Angioedema     Ace inhibitor Angioedma (Benazepril) 12/7/20       Current Outpatient Medications   Medication Sig    PHENobarbitaL (LUMINAL) 60 mg tablet TAKE 1 TABLET BY MOUTH FOUR TIMES DAILY    clonazePAM (KlonoPIN) 0.5 mg tablet Take 1 Tab by mouth four (4) times daily. Max Daily Amount: 2 mg.  gabapentin (NEURONTIN) 600 mg tablet TAKE 1 TABLET BY MOUTH THREE TIMES DAILY    amLODIPine (NORVASC) 5 mg tablet TAKE 1 TABLET BY MOUTH EVERY DAY    mirtazapine (REMERON) 15 mg tablet TAKE 1 TABLET EVERY NIGHT    venlafaxine (EFFEXOR) 100 mg tablet TAKE 1 TABLET THREE TIMES DAILY    tamsulosin (FLOMAX) 0.4 mg capsule TAKE ONE CAPSULE BY MOUTH DAILY    albuterol (PROVENTIL HFA, VENTOLIN HFA, PROAIR HFA) 90 mcg/actuation inhaler Take 2 Puffs by inhalation every six (6) hours as needed for Wheezing.  atorvastatin (LIPITOR) 20 mg tablet TAKE 1 TABLET EVERY DAY     No current facility-administered medications for this visit.    ]    Review of Systems   Constitutional: Positive for malaise/fatigue. Negative for chills and fever. HENT: Negative. Negative for congestion, ear pain, sore throat and tinnitus. Eyes: Negative. Negative for blurred vision, double vision and photophobia. Respiratory: Negative for cough, shortness of breath and wheezing. Cardiovascular: Negative. Negative for chest pain, palpitations and leg swelling. Gastrointestinal: Negative. Negative for abdominal pain, heartburn, nausea and vomiting. Genitourinary: Positive for frequency. Negative for dysuria, hematuria and urgency. Musculoskeletal: Positive for back pain (chronic), joint pain and neck pain. Negative for myalgias. Skin: Negative. Negative for itching and rash. Neurological: Positive for tremors. Negative for dizziness, tingling and headaches. Psychiatric/Behavioral: Positive for depression (improved on meds). Negative for memory loss, substance abuse and suicidal ideas. The patient is nervous/anxious and has insomnia (due to anxiety). Physical Exam  Constitutional:       General: He is not in acute distress. Appearance: Normal appearance. He is not ill-appearing. HENT:      Head: Normocephalic and atraumatic. Pulmonary:      Effort: No respiratory distress. Neurological:      Mental Status: He is alert and oriented to person, place, and time. Psychiatric:         Mood and Affect: Mood normal.         Behavior: Behavior normal.         Thought Content: Thought content normal.         Judgment: Judgment normal.         ASSESSMENT and PLAN    ICD-10-CM ICD-9-CM    1. Metastatic adenocarcinoma to prostate (HCC)  C79.82 198.82 HYDROmorphone (DILAUDID) 2 mg tablet   2. Mild depression ([address]  F32.0 311    3. Anxiety  F41.9 300.00    4. Closed compression fracture of L3 vertebra, sequela  S32.030S 905.1    5. Hypercholesterolemia  E78.00 272.0      Follow-up and Dispositions    · Return in about 3 months (around 5/25/2021) for follow up. Pt expressed understanding of visit summary and plans for any follow ups or referrals as well as any medications prescribed. Seen by synchronous (real-time) audio-video technology via doxy. me on 2/25/2021    The patient is aware that this patient-initiated Telehealth encounter is a billable service, with coverage as determined by his or her insurance carrier.  He/She is aware that they may receive a bill and has provided verbal consent to proceed: Yes        I was in the office while conducting this encounter.

## 2021-03-13 NOTE — PATIENT INSTRUCTIONS
Prostate Cancer: Care Instructions Your Care Instructions The prostate gland is a small, walnut-shaped organ that lies just below a man's bladder. It surrounds the urethra, the tube that carries urine from the bladder out of the body through the penis. Prostate cancer is the abnormal growth of cells in the prostate gland. Prostate cancer cells can spread within the prostate, to nearby lymph nodes and other tissues, and to other parts of the body. When the cancer hasn't spread outside the prostate, it is called localized prostate cancer. With localized prostate cancer, your options depend on how likely it is that your cancer will grow. Tests will show if your cancer is likely to grow. · Low-risk cancer isn't likely to grow right away. If your cancer is low-risk, you can choose active surveillance. This means your cancer will be watched closely by your doctor with regular checkups and tests to see if the cancer grows. This choice allows you to delay having surgery or radiation, often for many years. If the cancer grows very slowly, you may never need treatment. · Medium-risk cancer is more likely to grow. Some men with this type of cancer may be able to choose active surveillance. Others may need to choose surgery or radiation. · High-risk cancer is most likely to grow. If you have high-risk cancer, you will likely need to choose surgery or radiation. If your cancer has already spread outside the prostate or to other parts of the body, then you may have other treatments, like chemotherapy or hormone therapy. If you are over age [de-identified] or have other serious health problems, like heart disease, you may choose not to have treatments to cure your cancer. Instead, you can just have treatments to manage your symptoms. This is called watchful waiting. Finding out that you have cancer is scary. You may feel many emotions and may need some help coping. Seek out family, friends, and counselors for support.  You also can do things at home to make yourself feel better while you go through treatment. Call the [facility] (8-516.206.3249) or visit its website at [address] for more information. Follow-up care is a key part of your treatment and safety. Be sure to make and go to all appointments, and call your doctor if you are having problems. It's also a good idea to know your test results and keep a list of the medicines you take. How can you care for yourself at home? · Your doctor will talk to you about your treatment options. You may need to learn more about each of them before you can decide which treatment is best for you. · Take your medicines exactly as prescribed. Call your doctor if you think you are having a problem with your medicine. You will get more details on the specific medicines your doctor prescribes. · Eat healthy food. If you do not feel like eating, try to eat food that has protein and extra calories to keep up your strength and prevent weight loss. Drink liquid meal replacements for extra calories and protein. Try to eat your main meal early. · Take steps to control your stress and workload. Learn relaxation techniques. ? Share your feelings. Stress and tension affect our emotions. By expressing your feelings to others, you may be able to understand and cope with them. ? Consider joining a support group. Talking about a problem with your spouse, a good friend, or other people with similar problems is a good way to reduce tension and stress. ? Express yourself through art. Try writing, crafts, dance, or art to relieve stress. Some dance, writing, or art groups may be available just for people who have cancer. ? Be kind to your body and mind. Getting enough sleep, eating a healthy diet, and taking time to do things you enjoy can contribute to an overall feeling of balance in your life and can help reduce stress. ? Get help if you need it.  Discuss your concerns with your doctor or counselor. · Get some physical activity every day, but do not get too tired. Keep doing the hobbies you enjoy as your energy allows. · If you are vomiting or have diarrhea: ? Drink plenty of fluids (enough so that your urine is light yellow or clear like water) to prevent dehydration. Choose water and other caffeine-free clear liquids. If you have kidney, heart, or liver disease and have to limit fluids, talk with your doctor before you increase the amount of fluids you drink. ? When you are able to eat, try clear soups, mild foods, and liquids until all symptoms are gone for 12 to 48 hours. Jell-O, dry toast, crackers, and cooked cereal are also good choices. · If you have not already done so, prepare a list of advance directives. Advance directives are instructions to your doctor and family members about what kind of care you want if you become unable to speak or express yourself. When should you call for help? Call 911 anytime you think you may need emergency care. For example, call if: 
  · You passed out (lost consciousness). Call your doctor now or seek immediate medical care if: 
  · You have new or worse pain.  
  · You have new symptoms, such as a cough, belly pain, vomiting, diarrhea, or a rash.  
  · You have symptoms of a urinary tract infection. For example: ? You have blood or pus in your urine. ? You have pain in your back just below your rib cage. This is called flank pain. ? You have a fever, chills, or body aches. ? It hurts to urinate. ? You have groin or belly pain. Watch closely for changes in your health, and be sure to contact your doctor if: 
  · You have swollen glands in your armpits, groin, or neck.  
  · You have trouble controlling your urine.  
  · You do not get better as expected. Where can you learn more? Go to http://www.gray.com/ Enter V141 in the search box to learn more about \"Prostate Cancer: Care Instructions. \" Current as of: April 29, 2020               Content Version: 12.6 © 3320-4468 [employer]. Care instructions adapted under license by [employer] (which disclaims liability or warranty for this information). If you have questions about a medical condition or this instruction, always ask your healthcare professional. [address] any warranty or liability for your use of this information.

## 2021-04-27 ENCOUNTER — VIRTUAL VISIT (OUTPATIENT)
Dept: FAMILY MEDICINE CLINIC | Age: 66
End: 2021-04-27
Payer: MEDICARE

## 2021-04-27 DIAGNOSIS — G25.0 ESSENTIAL TREMOR: ICD-10-CM

## 2021-04-27 DIAGNOSIS — F41.9 ANXIETY: ICD-10-CM

## 2021-04-27 DIAGNOSIS — S32.030S CLOSED COMPRESSION FRACTURE OF L3 VERTEBRA, SEQUELA: ICD-10-CM

## 2021-04-27 DIAGNOSIS — G47.00 INSOMNIA, UNSPECIFIED TYPE: ICD-10-CM

## 2021-04-27 PROCEDURE — G9717 DOC PT DX DEP/BP F/U NT REQ: HCPCS | Performed by: PHYSICIAN ASSISTANT

## 2021-04-27 PROCEDURE — 3017F COLORECTAL CA SCREEN DOC REV: CPT | Performed by: PHYSICIAN ASSISTANT

## 2021-04-27 PROCEDURE — G8427 DOCREV CUR MEDS BY ELIG CLIN: HCPCS | Performed by: PHYSICIAN ASSISTANT

## 2021-04-27 PROCEDURE — 1101F PT FALLS ASSESS-DOCD LE1/YR: CPT | Performed by: PHYSICIAN ASSISTANT

## 2021-04-27 PROCEDURE — 99214 OFFICE O/P EST MOD 30 MIN: CPT | Performed by: PHYSICIAN ASSISTANT

## 2021-04-27 RX ORDER — CLONAZEPAM 0.5 MG/1
0.5 TABLET ORAL 4 TIMES DAILY
Qty: 120 TAB | Refills: 2 | Status: SHIPPED | OUTPATIENT
Start: 2021-04-27 | End: 2021-05-03 | Stop reason: SDUPTHER

## 2021-04-27 RX ORDER — AMLODIPINE BESYLATE 5 MG/1
TABLET ORAL
Qty: 90 TAB | Refills: 1 | Status: SHIPPED | OUTPATIENT
Start: 2021-04-27 | End: 2021-05-03 | Stop reason: SDUPTHER

## 2021-04-27 RX ORDER — PHENOBARBITAL 60 MG/1
TABLET ORAL
Qty: 120 TAB | Refills: 2 | Status: SHIPPED | OUTPATIENT
Start: 2021-04-27 | End: 2021-09-15 | Stop reason: SDUPTHER

## 2021-04-27 RX ORDER — VENLAFAXINE 100 MG/1
TABLET ORAL
Qty: 270 TAB | Refills: 1 | Status: SHIPPED | OUTPATIENT
Start: 2021-04-27 | End: 2021-10-19

## 2021-04-27 RX ORDER — AMOXICILLIN 250 MG
2 CAPSULE ORAL
COMMUNITY
Start: 2021-04-17 | End: 2022-05-02

## 2021-04-27 RX ORDER — GABAPENTIN 600 MG/1
TABLET ORAL
Qty: 90 TAB | Refills: 2 | Status: SHIPPED | OUTPATIENT
Start: 2021-04-27 | End: 2021-05-03 | Stop reason: SDUPTHER

## 2021-04-27 RX ORDER — ATORVASTATIN CALCIUM 20 MG/1
TABLET, FILM COATED ORAL
Qty: 90 TAB | Refills: 1 | Status: SHIPPED | OUTPATIENT
Start: 2021-04-27 | End: 2021-05-03 | Stop reason: SDUPTHER

## 2021-04-27 RX ORDER — MIRTAZAPINE 15 MG/1
TABLET, FILM COATED ORAL
Qty: 30 TAB | Refills: 2 | Status: SHIPPED | OUTPATIENT
Start: 2021-04-27 | End: 2021-05-03 | Stop reason: SDUPTHER

## 2021-05-03 DIAGNOSIS — S32.030S CLOSED COMPRESSION FRACTURE OF L3 VERTEBRA, SEQUELA: ICD-10-CM

## 2021-05-03 DIAGNOSIS — G47.00 INSOMNIA, UNSPECIFIED TYPE: ICD-10-CM

## 2021-05-03 DIAGNOSIS — F41.9 ANXIETY: ICD-10-CM

## 2021-05-06 RX ORDER — ATORVASTATIN CALCIUM 20 MG/1
TABLET, FILM COATED ORAL
Qty: 90 TAB | Refills: 1 | Status: SHIPPED | OUTPATIENT
Start: 2021-05-06

## 2021-05-06 RX ORDER — MIRTAZAPINE 15 MG/1
TABLET, FILM COATED ORAL
Qty: 30 TAB | Refills: 2 | Status: SHIPPED | OUTPATIENT
Start: 2021-05-06 | End: 2021-07-16

## 2021-05-06 RX ORDER — CLONAZEPAM 0.5 MG/1
0.5 TABLET ORAL 4 TIMES DAILY
Qty: 120 TAB | Refills: 2 | Status: SHIPPED | OUTPATIENT
Start: 2021-05-06 | End: 2021-09-14 | Stop reason: SDUPTHER

## 2021-05-06 RX ORDER — GABAPENTIN 600 MG/1
TABLET ORAL
Qty: 90 TAB | Refills: 2 | Status: SHIPPED | OUTPATIENT
Start: 2021-05-06 | End: 2021-09-15 | Stop reason: SDUPTHER

## 2021-05-06 RX ORDER — AMLODIPINE BESYLATE 5 MG/1
TABLET ORAL
Qty: 90 TAB | Refills: 1 | Status: SHIPPED | OUTPATIENT
Start: 2021-05-06

## 2021-05-26 NOTE — PATIENT INSTRUCTIONS
Shortness of Breath: Care Instructions  Your Care Instructions     Shortness of breath has many causes. Sometimes conditions such as anxiety can lead to shortness of breath. Some people get mild shortness of breath when they exercise. Trouble breathing also can be a symptom of a serious problem, such as asthma, lung disease, emphysema, heart problems, and pneumonia. If your shortness of breath continues, you may need tests and treatment. Watch for any changes in your breathing and other symptoms. Follow-up care is a key part of your treatment and safety. Be sure to make and go to all appointments, and call your doctor if you are having problems. It's also a good idea to know your test results and keep a list of the medicines you take. How can you care for yourself at home? · Do not smoke or allow others to smoke around you. If you need help quitting, talk to your doctor about stop-smoking programs and medicines. These can increase your chances of quitting for good. · Get plenty of rest and sleep. · Take your medicines exactly as prescribed. Call your doctor if you think you are having a problem with your medicine. · Find healthy ways to deal with stress. ? Exercise daily. ? Get plenty of sleep. ? Eat regularly and well. When should you call for help? Call 911 anytime you think you may need emergency care. For example, call if:    · You have severe shortness of breath.     · You have symptoms of a heart attack. These may include:  ? Chest pain or pressure, or a strange feeling in the chest.  ? Sweating. ? Shortness of breath. ? Nausea or vomiting. ? Pain, pressure, or a strange feeling in the back, neck, jaw, or upper belly or in one or both shoulders or arms. ? Lightheadedness or sudden weakness. ? A fast or irregular heartbeat. After you call 911, the  may tell you to chew 1 adult-strength or 2 to 4 low-dose aspirin. Wait for an ambulance. Do not try to drive yourself.    Call your doctor now or seek immediate medical care if:    · Your shortness of breath gets worse or you start to wheeze. Wheezing is a high-pitched sound when you breathe.     · You wake up at night out of breath or have to prop your head up on several pillows to breathe.     · You are short of breath after only light activity or while at rest.   Watch closely for changes in your health, and be sure to contact your doctor if:    · You do not get better over the next 1 to 2 days. Where can you learn more? Go to http://www.gray.com/  Enter S780 in the search box to learn more about \"Shortness of Breath: Care Instructions. \"  Current as of: October 26, 2020               Content Version: 12.8  © 2006-2021 [employer], [employer]. Care instructions adapted under license by [employer] (which disclaims liability or warranty for this information). If you have questions about a medical condition or this instruction, always ask your healthcare professional. [place] [address] any warranty or liability for your use of this information.

## 2021-05-26 NOTE — PROGRESS NOTES
HISTORY OF PRESENT ILLNESS  Chito Ellison is a 77 y.o. male. HPI   Pt is being seen via doxy. me for f/u on hosp admission on 4/10 for renal failure/anemia/UTI/poss GI bleed. He was given abx and transfused and was feeling much better. He was discharged on 4/17 but the past few days has had SOB, decreased appetite and malaise. With pts SOB and increasing malaise advised him to either go to the ER or at least to urgent care so they could check his Hgb/Hct to be certain it was not dropping again. He expressed understanding and states he would go. Allergies   Allergen Reactions    Ace Inhibitors Angioedema     Ace inhibitor Angioedma (Benazepril) 12/7/20       Current Outpatient Medications   Medication Sig    senna-docusate (PERICOLACE) 8.6-50 mg per tablet Take 2 Tabs by mouth nightly.  b complex-vitamin c-folic acid (NEPHROCAPS) 1 mg capsule Take 1 Cap by mouth daily. (Patient not taking: Reported on 5/19/2021)    venlafaxine (EFFEXOR) 100 mg tablet TAKE 1 TABLET THREE TIMES DAILY    PHENobarbitaL (LUMINAL) 60 mg tablet TAKE 1 TABLET BY MOUTH FOUR TIMES DAILY    apalutamide (ERLEADA) 60 mg tablet Take 4 Tablets by mouth daily.  ergocalciferol (ERGOCALCIFEROL) 1,250 mcg (50,000 unit) capsule Take 1 Capsule by mouth every seven (7) days.  mirtazapine (REMERON) 15 mg tablet TAKE 1 TABLET EVERY NIGHT    atorvastatin (LIPITOR) 20 mg tablet TAKE 1 TABLET EVERY DAY    amLODIPine (NORVASC) 5 mg tablet TAKE 1 TABLET BY MOUTH EVERY DAY    gabapentin (NEURONTIN) 600 mg tablet TAKE 1 TABLET BY MOUTH THREE TIMES DAILY    clonazePAM (KlonoPIN) 0.5 mg tablet Take 1 Tab by mouth four (4) times daily. Max Daily Amount: 2 mg.  tamsulosin (FLOMAX) 0.4 mg capsule TAKE ONE CAPSULE BY MOUTH DAILY    albuterol (PROVENTIL HFA, VENTOLIN HFA, PROAIR HFA) 90 mcg/actuation inhaler Take 2 Puffs by inhalation every six (6) hours as needed for Wheezing.  (Patient not taking: Reported on 5/19/2021)     No current facility-administered medications for this visit. Review of Systems   Constitutional: Positive for malaise/fatigue and weight loss (25lbs in past 2-3 months). Negative for chills and fever. Decreased appetite   HENT: Negative. Negative for congestion, ear pain, sore throat and tinnitus. Eyes: Negative. Negative for blurred vision, double vision and photophobia. Respiratory: Positive for shortness of breath. Negative for cough and wheezing. Cardiovascular: Negative. Negative for chest pain, palpitations and leg swelling. Gastrointestinal: Negative. Negative for abdominal pain, heartburn, nausea and vomiting. Genitourinary: Positive for frequency. Negative for dysuria, hematuria and urgency. Musculoskeletal: Positive for back pain (chronic) and joint pain (chronic). Negative for myalgias. Skin: Negative. Negative for itching and rash. Neurological: Positive for tremors. Negative for dizziness, tingling and headaches. Psychiatric/Behavioral: Positive for depression (improved on meds). Negative for memory loss, substance abuse and suicidal ideas. The patient is nervous/anxious and has insomnia (due to anxiety). Physical Exam  Constitutional:       General: He is not in acute distress. Appearance: Normal appearance. He is not ill-appearing. HENT:      Head: Normocephalic and atraumatic. Pulmonary:      Effort: No respiratory distress. Neurological:      Mental Status: He is alert and oriented to person, place, and time. Psychiatric:         Mood and Affect: Mood normal.         Behavior: Behavior normal.         Thought Content: Thought content normal.         Judgment: Judgment normal.         ASSESSMENT and PLAN    ICD-10-CM ICD-9-CM    1. Anxiety  F41.9 300.00 venlafaxine (EFFEXOR) 100 mg tablet      DISCONTINUED: clonazePAM (KlonoPIN) 0.5 mg tablet   2.  Closed compression fracture of L3 vertebra, sequela  S32.030S 905.1 DISCONTINUED: gabapentin (NEURONTIN) 600 mg tablet   3. Essential tremor  G25.0 333.1 PHENobarbitaL (LUMINAL) 60 mg tablet   4. Insomnia, unspecified type  G47.00 780.52 DISCONTINUED: mirtazapine (REMERON) 15 mg tablet     Follow-up and Dispositions    · Return if symptoms worsen or fail to improve. Pt expressed understanding of visit summary and plans for any follow ups or referrals as well as any medications prescribed. Seen by synchronous (real-time) audio-video technology via doxy. me on 4/27/2021    The patient is aware that this patient-initiated Telehealth encounter is a billable service, with coverage as determined by his or her insurance carrier. He/She is aware that they may receive a bill and has provided verbal consent to proceed: Yes        I was in the office while conducting this encounter.

## 2021-07-14 DIAGNOSIS — G47.00 INSOMNIA, UNSPECIFIED TYPE: ICD-10-CM

## 2021-07-16 RX ORDER — MIRTAZAPINE 15 MG/1
TABLET, FILM COATED ORAL
Qty: 90 TABLET | Refills: 0 | Status: SHIPPED | OUTPATIENT
Start: 2021-07-16 | End: 2021-09-15

## 2021-07-29 ENCOUNTER — VIRTUAL VISIT (OUTPATIENT)
Dept: FAMILY MEDICINE CLINIC | Age: 66
End: 2021-07-29
Payer: MEDICARE

## 2021-07-29 DIAGNOSIS — N18.5 CHRONIC KIDNEY DISEASE, STAGE 5 (HCC): ICD-10-CM

## 2021-07-29 DIAGNOSIS — F32.A MILD DEPRESSION: Primary | ICD-10-CM

## 2021-07-29 PROCEDURE — G8536 NO DOC ELDER MAL SCRN: HCPCS | Performed by: PHYSICIAN ASSISTANT

## 2021-07-29 PROCEDURE — 1101F PT FALLS ASSESS-DOCD LE1/YR: CPT | Performed by: PHYSICIAN ASSISTANT

## 2021-07-29 PROCEDURE — 3017F COLORECTAL CA SCREEN DOC REV: CPT | Performed by: PHYSICIAN ASSISTANT

## 2021-07-29 PROCEDURE — G8427 DOCREV CUR MEDS BY ELIG CLIN: HCPCS | Performed by: PHYSICIAN ASSISTANT

## 2021-07-29 PROCEDURE — G8419 CALC BMI OUT NRM PARAM NOF/U: HCPCS | Performed by: PHYSICIAN ASSISTANT

## 2021-07-29 PROCEDURE — 99214 OFFICE O/P EST MOD 30 MIN: CPT | Performed by: PHYSICIAN ASSISTANT

## 2021-07-29 PROCEDURE — G9717 DOC PT DX DEP/BP F/U NT REQ: HCPCS | Performed by: PHYSICIAN ASSISTANT

## 2021-07-29 RX ORDER — DUTASTERIDE 0.5 MG/1
0.5 CAPSULE, LIQUID FILLED ORAL DAILY
COMMUNITY
Start: 2021-05-25 | End: 2021-08-16 | Stop reason: SDUPTHER

## 2021-07-29 RX ORDER — OXYCODONE AND ACETAMINOPHEN 5; 325 MG/1; MG/1
TABLET ORAL
COMMUNITY
Start: 2021-05-25 | End: 2021-07-30 | Stop reason: ALTCHOICE

## 2021-07-29 NOTE — PROGRESS NOTES
HISTORY OF PRESENT ILLNESS  Kathie Snow is a 77 y.o. male. HPI   Pt is being seen via doxy. me for concerns of worsening depression. He denies thoughts of harming himself or anyone else. He has been following up with urology for his prostate cancer but it is not improving. He is starting to feel very depressed and feels he needs a change in meds. Allergies   Allergen Reactions    Ace Inhibitors Angioedema     Ace inhibitor Angioedma (Benazepril) 12/7/20       Current Outpatient Medications   Medication Sig    ARIPiprazole (ABILIFY) 2 mg tablet Take 1 Tablet by mouth daily.  dutasteride (AVODART) 0.5 mg capsule Take 1 Capsule by mouth daily.  hydrALAZINE (APRESOLINE) 50 mg tablet Take 1 Tablet by mouth three (3) times daily.  ARIPiprazole (ABILIFY) 2 mg tablet Take 1 Tablet by mouth daily.  traMADoL (ULTRAM) 50 mg tablet Take 1 Tablet by mouth every twelve (12) hours every twelve (12) hours for 30 days. Max Daily Amount: 100 mg.    mirtazapine (REMERON) 15 mg tablet TAKE 1 TABLET EVERY NIGHT    tamsulosin (FLOMAX) 0.4 mg capsule TAKE 2 CAPSULES EVERY DAY  AFTER  DINNER    apalutamide (ERLEADA) 60 mg tablet Take 4 Tablets by mouth daily.  ergocalciferol (ERGOCALCIFEROL) 1,250 mcg (50,000 unit) capsule Take 1 Capsule by mouth every seven (7) days.  atorvastatin (LIPITOR) 20 mg tablet TAKE 1 TABLET EVERY DAY    amLODIPine (NORVASC) 5 mg tablet TAKE 1 TABLET BY MOUTH EVERY DAY    gabapentin (NEURONTIN) 600 mg tablet TAKE 1 TABLET BY MOUTH THREE TIMES DAILY    clonazePAM (KlonoPIN) 0.5 mg tablet Take 1 Tab by mouth four (4) times daily. Max Daily Amount: 2 mg.  senna-docusate (PERICOLACE) 8.6-50 mg per tablet Take 2 Tabs by mouth nightly.  venlafaxine (EFFEXOR) 100 mg tablet TAKE 1 TABLET THREE TIMES DAILY    PHENobarbitaL (LUMINAL) 60 mg tablet TAKE 1 TABLET BY MOUTH FOUR TIMES DAILY     No current facility-administered medications for this visit.        Review of Systems Constitutional: Positive for malaise/fatigue. Negative for chills and fever. HENT: Negative. Negative for congestion, ear pain, sore throat and tinnitus. Eyes: Negative. Negative for blurred vision, double vision and photophobia. Respiratory: Negative for cough, shortness of breath and wheezing. Cardiovascular: Negative. Negative for chest pain, palpitations and leg swelling. Gastrointestinal: Negative. Negative for abdominal pain, heartburn, nausea and vomiting. Genitourinary: Positive for frequency (chronic). Negative for dysuria, hematuria and urgency. Musculoskeletal: Positive for back pain (chronic), joint pain and neck pain. Negative for myalgias. Skin: Negative. Negative for itching and rash. Neurological: Positive for tremors. Negative for dizziness, tingling and headaches. Psychiatric/Behavioral: Positive for depression (worsening). Negative for memory loss, substance abuse and suicidal ideas. The patient is nervous/anxious and has insomnia (due to anxiety). Physical Exam  Constitutional:       General: He is not in acute distress. Appearance: Normal appearance. He is not ill-appearing. HENT:      Head: Normocephalic and atraumatic. Pulmonary:      Effort: No respiratory distress. Neurological:      Mental Status: He is alert and oriented to person, place, and time. Psychiatric:         Mood and Affect: Mood is depressed. Mood is not anxious. Behavior: Behavior normal.         Thought Content: Thought content normal.         Judgment: Judgment normal.         ASSESSMENT and PLAN    ICD-10-CM ICD-9-CM    1. Mild depression (HCC)  F32.0 311 ARIPiprazole (ABILIFY) 2 mg tablet   2. Chronic kidney disease, stage 5 (HCC)  N18.5 585.5      Follow-up and Dispositions    · Return in about 3 months (around 10/29/2021) for follow up. Pt expressed understanding of visit summary and plans for any follow ups or referrals as well as any medications prescribed. Seen by synchronous (real-time) audio-video technology via doxy. me on 7/29/2021    The patient is aware that this patient-initiated Telehealth encounter is a billable service, with coverage as determined by his or her insurance carrier. He/She is aware that they may receive a bill and has provided verbal consent to proceed: Yes        I was in the office while conducting this encounter.

## 2021-07-29 NOTE — PATIENT INSTRUCTIONS
DASH Diet: Care Instructions  Your Care Instructions     The DASH diet is an eating plan that can help lower your blood pressure. DASH stands for Dietary Approaches to Stop Hypertension. Hypertension is high blood pressure. The DASH diet focuses on eating foods that are high in calcium, potassium, and magnesium. These nutrients can lower blood pressure. The foods that are highest in these nutrients are fruits, vegetables, low-fat dairy products, nuts, seeds, and legumes. But taking calcium, potassium, and magnesium supplements instead of eating foods that are high in those nutrients does not have the same effect. The DASH diet also includes whole grains, fish, and poultry. The DASH diet is one of several lifestyle changes your doctor may recommend to lower your high blood pressure. Your doctor may also want you to decrease the amount of sodium in your diet. Lowering sodium while following the DASH diet can lower blood pressure even further than just the DASH diet alone. Follow-up care is a key part of your treatment and safety. Be sure to make and go to all appointments, and call your doctor if you are having problems. It's also a good idea to know your test results and keep a list of the medicines you take. How can you care for yourself at home? Following the DASH diet  · Eat 4 to 5 servings of fruit each day. A serving is 1 medium-sized piece of fruit, ½ cup chopped or canned fruit, 1/4 cup dried fruit, or 4 ounces (½ cup) of fruit juice. Choose fruit more often than fruit juice. · Eat 4 to 5 servings of vegetables each day. A serving is 1 cup of lettuce or raw leafy vegetables, ½ cup of chopped or cooked vegetables, or 4 ounces (½ cup) of vegetable juice. Choose vegetables more often than vegetable juice. · Get 2 to 3 servings of low-fat and fat-free dairy each day. A serving is 8 ounces of milk, 1 cup of yogurt, or 1 ½ ounces of cheese. · Eat 6 to 8 servings of grains each day.  A serving is 1 slice of bread, 1 ounce of dry cereal, or ½ cup of cooked rice, pasta, or cooked cereal. Try to choose whole-grain products as much as possible. · Limit lean meat, poultry, and fish to 2 servings each day. A serving is 3 ounces, about the size of a deck of cards. · Eat 4 to 5 servings of nuts, seeds, and legumes (cooked dried beans, lentils, and split peas) each week. A serving is 1/3 cup of nuts, 2 tablespoons of seeds, or ½ cup of cooked beans or peas. · Limit fats and oils to 2 to 3 servings each day. A serving is 1 teaspoon of vegetable oil or 2 tablespoons of salad dressing. · Limit sweets and added sugars to 5 servings or less a week. A serving is 1 tablespoon jelly or jam, ½ cup sorbet, or 1 cup of lemonade. · Eat less than 2,300 milligrams (mg) of sodium a day. If you limit your sodium to 1,500 mg a day, you can lower your blood pressure even more. · Be aware that all of these are the suggested number of servings for people who eat 1,800 to 2,000 calories a day. Your recommended number of servings may be different if you need more or fewer calories. Tips for success  · Start small. Do not try to make dramatic changes to your diet all at once. You might feel that you are missing out on your favorite foods and then be more likely to not follow the plan. Make small changes, and stick with them. Once those changes become habit, add a few more changes. · Try some of the following:  ? Make it a goal to eat a fruit or vegetable at every meal and at snacks. This will make it easy to get the recommended amount of fruits and vegetables each day. ? Try yogurt topped with fruit and nuts for a snack or healthy dessert. ? Add lettuce, tomato, cucumber, and onion to sandwiches. ? Combine a ready-made pizza crust with low-fat mozzarella cheese and lots of vegetable toppings. Try using tomatoes, squash, spinach, broccoli, carrots, cauliflower, and onions. ?  Have a variety of cut-up vegetables with a low-fat dip as an appetizer instead of chips and dip. ? Sprinkle sunflower seeds or chopped almonds over salads. Or try adding chopped walnuts or almonds to cooked vegetables. ? Try some vegetarian meals using beans and peas. Add garbanzo or kidney beans to salads. Make burritos and tacos with mashed resendez beans or black beans. Where can you learn more? Go to http://www.griffith.com/  Enter H967 in the search box to learn more about \"DASH Diet: Care Instructions. \"  Current as of: August 31, 2020               Content Version: 12.8  © 9289-4592 [employer]. Care instructions adapted under license by [employer] (which disclaims liability or warranty for this information). If you have questions about a medical condition or this instruction, always ask your healthcare professional. [address] any warranty or liability for your use of this information.

## 2021-07-30 ENCOUNTER — VIRTUAL VISIT (OUTPATIENT)
Dept: FAMILY MEDICINE CLINIC | Age: 66
End: 2021-07-30
Payer: MEDICARE

## 2021-07-30 DIAGNOSIS — M54.50 CHRONIC BILATERAL LOW BACK PAIN WITHOUT SCIATICA: ICD-10-CM

## 2021-07-30 DIAGNOSIS — F11.99 OPIOID USE, UNSPECIFIED WITH UNSPECIFIED OPIOID-INDUCED DISORDER (HCC): ICD-10-CM

## 2021-07-30 DIAGNOSIS — F32.A MILD DEPRESSION: ICD-10-CM

## 2021-07-30 DIAGNOSIS — S32.030S CLOSED COMPRESSION FRACTURE OF L3 VERTEBRA, SEQUELA: ICD-10-CM

## 2021-07-30 DIAGNOSIS — C79.82 METASTATIC ADENOCARCINOMA TO PROSTATE (HCC): Primary | ICD-10-CM

## 2021-07-30 DIAGNOSIS — F11.29 OPIOID DEPENDENCE WITH UNSPECIFIED OPIOID-INDUCED DISORDER (HCC): ICD-10-CM

## 2021-07-30 DIAGNOSIS — F11.20 OPIOID DEPENDENCE, UNCOMPLICATED (HCC): ICD-10-CM

## 2021-07-30 DIAGNOSIS — F41.9 ANXIETY: ICD-10-CM

## 2021-07-30 DIAGNOSIS — G89.29 CHRONIC BILATERAL LOW BACK PAIN WITHOUT SCIATICA: ICD-10-CM

## 2021-07-30 PROCEDURE — G8419 CALC BMI OUT NRM PARAM NOF/U: HCPCS | Performed by: PHYSICIAN ASSISTANT

## 2021-07-30 PROCEDURE — 3017F COLORECTAL CA SCREEN DOC REV: CPT | Performed by: PHYSICIAN ASSISTANT

## 2021-07-30 PROCEDURE — 1101F PT FALLS ASSESS-DOCD LE1/YR: CPT | Performed by: PHYSICIAN ASSISTANT

## 2021-07-30 PROCEDURE — 99214 OFFICE O/P EST MOD 30 MIN: CPT | Performed by: PHYSICIAN ASSISTANT

## 2021-07-30 PROCEDURE — G9717 DOC PT DX DEP/BP F/U NT REQ: HCPCS | Performed by: PHYSICIAN ASSISTANT

## 2021-07-30 PROCEDURE — G8536 NO DOC ELDER MAL SCRN: HCPCS | Performed by: PHYSICIAN ASSISTANT

## 2021-07-30 PROCEDURE — G8427 DOCREV CUR MEDS BY ELIG CLIN: HCPCS | Performed by: PHYSICIAN ASSISTANT

## 2021-07-30 RX ORDER — TRAMADOL HYDROCHLORIDE 50 MG/1
50 TABLET ORAL EVERY 12 HOURS
Qty: 60 TABLET | Refills: 0 | Status: SHIPPED | OUTPATIENT
Start: 2021-07-30 | End: 2021-08-29

## 2021-07-30 RX ORDER — ARIPIPRAZOLE 2 MG/1
2 TABLET ORAL DAILY
Qty: 30 TABLET | Refills: 0 | Status: SHIPPED | OUTPATIENT
Start: 2021-07-30

## 2021-07-30 NOTE — PATIENT INSTRUCTIONS
Prostate Cancer: Care Instructions  Your Care Instructions     The prostate gland is a small, walnut-shaped organ that lies just below a man's bladder. It surrounds the urethra, the tube that carries urine from the bladder out of the body through the penis. Prostate cancer is the abnormal growth of cells in the prostate gland. Prostate cancer cells can spread within the prostate, to nearby lymph nodes and other tissues, and to other parts of the body. When the cancer hasn't spread outside the prostate, it is called localized prostate cancer. With localized prostate cancer, your options depend on how likely it is that your cancer will grow. Tests will show if your cancer is likely to grow. · Low-risk cancer isn't likely to grow right away. If your cancer is low-risk, you can choose active surveillance. This means your cancer will be watched closely by your doctor with regular checkups and tests to see if the cancer grows. This choice allows you to delay having surgery or radiation, often for many years. If the cancer grows very slowly, you may never need treatment. · Medium-risk cancer is more likely to grow. Some men with this type of cancer may be able to choose active surveillance. Others may need to choose surgery or radiation. · High-risk cancer is most likely to grow. If you have high-risk cancer, you will likely need to choose surgery or radiation. If your cancer has already spread outside the prostate or to other parts of the body, then you may have other treatments, like chemotherapy or hormone therapy. If you are over age [de-identified] or have other serious health problems, like heart disease, you may choose not to have treatments to cure your cancer. Instead, you can just have treatments to manage your symptoms. This is called watchful waiting. Finding out that you have cancer is scary. You may feel many emotions and may need some help coping. Seek out family, friends, and counselors for support.  You also can do things at home to make yourself feel better while you go through treatment. Call the [facility] ([phone]) or visit its website at [employer] [employer] [place] for more information. Follow-up care is a key part of your treatment and safety. Be sure to make and go to all appointments, and call your doctor if you are having problems. It's also a good idea to know your test results and keep a list of the medicines you take. How can you care for yourself at home? · Your doctor will talk to you about your treatment options. You may need to learn more about each of them before you can decide which treatment is best for you. · Take your medicines exactly as prescribed. Call your doctor if you think you are having a problem with your medicine. You will get more details on the specific medicines your doctor prescribes. · Eat healthy food. If you do not feel like eating, try to eat food that has protein and extra calories to keep up your strength and prevent weight loss. Drink liquid meal replacements for extra calories and protein. Try to eat your main meal early. · Take steps to control your stress and workload. Learn relaxation techniques. ? Share your feelings. Stress and tension affect our emotions. By expressing your feelings to others, you may be able to understand and cope with them. ? Consider joining a support group. Talking about a problem with your spouse, a good friend, or other people with similar problems is a good way to reduce tension and stress. ? Express yourself through art. Try writing, crafts, dance, or art to relieve stress. Some dance, writing, or art groups may be available just for people who have cancer. ? Be kind to your body and mind. Getting enough sleep, eating a healthy diet, and taking time to do things you enjoy can contribute to an overall feeling of balance in your life and can help reduce stress. ? Get help if you need it.  Discuss your concerns with your doctor or counselor. · Get some physical activity every day, but do not get too tired. Keep doing the hobbies you enjoy as your energy allows. · If you are vomiting or have diarrhea:  ? Drink plenty of fluids to prevent dehydration. Choose water and other caffeine-free clear liquids. If you have kidney, heart, or liver disease and have to limit fluids, talk with your doctor before you increase the amount of fluids you drink. ? When you are able to eat, try clear soups, mild foods, and liquids until all symptoms are gone for 12 to 48 hours. Jell-O, dry toast, crackers, and cooked cereal are also good choices. · If you have not already done so, prepare a list of advance directives. Advance directives are instructions to your doctor and family members about what kind of care you want if you become unable to speak or express yourself. When should you call for help? Call 911 anytime you think you may need emergency care. For example, call if:    · You passed out (lost consciousness). Call your doctor now or seek immediate medical care if:    · You have new or worse pain.     · You have new symptoms, such as a cough, belly pain, vomiting, diarrhea, or a rash.     · You have symptoms of a urinary tract infection. For example:  ? You have blood or pus in your urine. ? You have pain in your back just below your rib cage. This is called flank pain. ? You have a fever, chills, or body aches. ? It hurts to urinate. ? You have groin or belly pain. Watch closely for changes in your health, and be sure to contact your doctor if:    · You have swollen glands in your armpits, groin, or neck.     · You have trouble controlling your urine.     · You do not get better as expected. Where can you learn more? Go to http://www.gray.com/  Enter V141 in the search box to learn more about \"Prostate Cancer: Care Instructions. \"  Current as of: December 17, 2020               Content Version: 12.8  © 4018-5858 [employer]. Care instructions adapted under license by [employer] (which disclaims liability or warranty for this information). If you have questions about a medical condition or this instruction, always ask your healthcare professional. [address] any warranty or liability for your use of this information.

## 2021-07-30 NOTE — PROGRESS NOTES
HISTORY OF PRESENT ILLNESS  Kathie Snow is a 77 y.o. male. HPI   Pt is being seen via doxy. me for follow up on his pain. Pt is having worsening back pain and is requesting med for it. He had a recent hosp stay and was dx with stage 5 kidney failure and advised on dialysis but never started it. He has improved some and last Cr was 3.4 and BUN 58 in June. He saw nephrology but would like to see a different one. Discussed pain med limitations with kidney failure and will refer to pain management but will give ultram in the meantime. He has follow up with oncology next week and advised him to also discuss pain meds with them. Allergies   Allergen Reactions    Ace Inhibitors Angioedema     Ace inhibitor Angioedma (Benazepril) 12/7/20       Current Outpatient Medications   Medication Sig    ARIPiprazole (ABILIFY) 2 mg tablet Take 1 Tablet by mouth daily.  traMADoL (ULTRAM) 50 mg tablet Take 1 Tablet by mouth every twelve (12) hours every twelve (12) hours for 30 days. Max Daily Amount: 100 mg.  dutasteride (AVODART) 0.5 mg capsule Take 0.5 mg by mouth daily.  mirtazapine (REMERON) 15 mg tablet TAKE 1 TABLET EVERY NIGHT    tamsulosin (FLOMAX) 0.4 mg capsule TAKE 2 CAPSULES EVERY DAY  AFTER  DINNER    apalutamide (ERLEADA) 60 mg tablet Take 4 Tablets by mouth daily.  ergocalciferol (ERGOCALCIFEROL) 1,250 mcg (50,000 unit) capsule Take 1 Capsule by mouth every seven (7) days.  atorvastatin (LIPITOR) 20 mg tablet TAKE 1 TABLET EVERY DAY    amLODIPine (NORVASC) 5 mg tablet TAKE 1 TABLET BY MOUTH EVERY DAY    gabapentin (NEURONTIN) 600 mg tablet TAKE 1 TABLET BY MOUTH THREE TIMES DAILY    clonazePAM (KlonoPIN) 0.5 mg tablet Take 1 Tab by mouth four (4) times daily. Max Daily Amount: 2 mg.  senna-docusate (PERICOLACE) 8.6-50 mg per tablet Take 2 Tabs by mouth nightly.     venlafaxine (EFFEXOR) 100 mg tablet TAKE 1 TABLET THREE TIMES DAILY    PHENobarbitaL (LUMINAL) 60 mg tablet TAKE 1 TABLET BY MOUTH FOUR TIMES DAILY     No current facility-administered medications for this visit. Review of Systems   Constitutional: Positive for malaise/fatigue and weight loss. Negative for chills and fever. Decreased appetite   HENT: Negative. Negative for congestion, ear pain, sore throat and tinnitus. Eyes: Negative. Negative for blurred vision, double vision and photophobia. Respiratory: Positive for shortness of breath. Negative for cough and wheezing. Cardiovascular: Negative. Negative for chest pain, palpitations and leg swelling. Gastrointestinal: Negative. Negative for abdominal pain, heartburn, nausea and vomiting. Genitourinary: Positive for frequency. Negative for dysuria, hematuria and urgency. Musculoskeletal: Positive for back pain (chronic - worsening) and joint pain (chronic). Negative for myalgias. Skin: Negative. Negative for itching and rash. Neurological: Positive for tremors. Negative for dizziness, tingling and headaches. Psychiatric/Behavioral: Positive for depression (controlled on meds). Negative for memory loss, substance abuse and suicidal ideas. The patient is nervous/anxious and has insomnia (due to anxiety). Physical Exam  Constitutional:       General: He is not in acute distress. Appearance: Normal appearance. He is not ill-appearing. HENT:      Head: Normocephalic and atraumatic. Pulmonary:      Effort: No respiratory distress. Neurological:      Mental Status: He is alert and oriented to person, place, and time. Psychiatric:         Mood and Affect: Mood is anxious. Behavior: Behavior normal. Behavior is cooperative. Thought Content: Thought content normal. Thought content is not paranoid. Thought content does not include homicidal or suicidal ideation. Judgment: Judgment normal.         ASSESSMENT and PLAN    ICD-10-CM ICD-9-CM    1.  Metastatic adenocarcinoma to prostate [facility])  C79.82 198.82 traMADoL (ULTRAM) 50 mg tablet      REFERRAL TO PAIN MANAGEMENT   2. Anxiety  F41.9 300.00 ARIPiprazole (ABILIFY) 2 mg tablet   3. Mild depression (HCC)  F32.0 311 ARIPiprazole (ABILIFY) 2 mg tablet   4. Closed compression fracture of L3 vertebra, sequela  S32.030S 905.1 traMADoL (ULTRAM) 50 mg tablet      REFERRAL TO PAIN MANAGEMENT   5. Chronic bilateral low back pain without sciatica  M54.5 724.2 traMADoL (ULTRAM) 50 mg tablet    G89.29 338.29 REFERRAL TO PAIN MANAGEMENT   6. Opioid use, unspecified with unspecified opioid-induced disorder  F11.99 292.9      305.50    7. Opioid dependence with unspecified opioid-induced disorder  F11.29 292.9      304.00    8. Opioid dependence, uncomplicated  H88.55 986.08      Follow-up and Dispositions    · Return in about 4 weeks (around 8/27/2021) for follow up. Pt expressed understanding of visit summary and plans for any follow ups or referrals as well as any medications prescribed. Seen by synchronous (real-time) audio-video technology via doxy. me on 7/30/2021    The patient is aware that this patient-initiated Telehealth encounter is a billable service, with coverage as determined by his or her insurance carrier. He/She is aware that they may receive a bill and has provided verbal consent to proceed: Yes        I was in the office while conducting this encounter.

## 2021-08-18 RX ORDER — HYDRALAZINE HYDROCHLORIDE 50 MG/1
50 TABLET, FILM COATED ORAL 3 TIMES DAILY
Qty: 90 TABLET | Refills: 2 | Status: SHIPPED | OUTPATIENT
Start: 2021-08-18 | End: 2021-10-19

## 2021-08-18 RX ORDER — DUTASTERIDE 0.5 MG/1
0.5 CAPSULE, LIQUID FILLED ORAL DAILY
Qty: 90 CAPSULE | Refills: 1 | Status: SHIPPED | OUTPATIENT
Start: 2021-08-18 | End: 2022-01-14

## 2021-08-25 RX ORDER — ARIPIPRAZOLE 2 MG/1
2 TABLET ORAL DAILY
Qty: 30 TABLET | Refills: 0
Start: 2021-08-25

## 2021-09-02 ENCOUNTER — VIRTUAL VISIT (OUTPATIENT)
Dept: FAMILY MEDICINE CLINIC | Age: 66
End: 2021-09-02

## 2021-09-13 DIAGNOSIS — G47.00 INSOMNIA, UNSPECIFIED TYPE: ICD-10-CM

## 2021-09-13 DIAGNOSIS — G25.0 ESSENTIAL TREMOR: ICD-10-CM

## 2021-09-13 DIAGNOSIS — S32.030S CLOSED COMPRESSION FRACTURE OF L3 VERTEBRA, SEQUELA: ICD-10-CM

## 2021-09-13 DIAGNOSIS — F41.9 ANXIETY: ICD-10-CM

## 2021-09-15 RX ORDER — GABAPENTIN 600 MG/1
TABLET ORAL
Qty: 90 TABLET | Refills: 0 | Status: SHIPPED | OUTPATIENT
Start: 2021-09-15 | End: 2021-10-20 | Stop reason: SDUPTHER

## 2021-09-15 RX ORDER — CLONAZEPAM 0.5 MG/1
0.5 TABLET ORAL 4 TIMES DAILY
Qty: 120 TABLET | Refills: 0 | Status: SHIPPED | OUTPATIENT
Start: 2021-09-15 | End: 2021-11-11 | Stop reason: SDUPTHER

## 2021-09-15 RX ORDER — PHENOBARBITAL 60 MG/1
TABLET ORAL
Qty: 120 TABLET | Refills: 0 | Status: SHIPPED | OUTPATIENT
Start: 2021-09-15 | End: 2021-10-20 | Stop reason: SDUPTHER

## 2021-09-15 RX ORDER — MIRTAZAPINE 15 MG/1
TABLET, FILM COATED ORAL
Qty: 90 TABLET | Refills: 0 | Status: SHIPPED | OUTPATIENT
Start: 2021-09-15 | End: 2021-11-21

## 2021-10-16 DIAGNOSIS — F41.9 ANXIETY: ICD-10-CM

## 2021-10-19 RX ORDER — VENLAFAXINE 100 MG/1
TABLET ORAL
Qty: 270 TABLET | Refills: 1 | Status: SHIPPED | OUTPATIENT
Start: 2021-10-19

## 2021-10-19 RX ORDER — HYDRALAZINE HYDROCHLORIDE 50 MG/1
TABLET, FILM COATED ORAL
Qty: 270 TABLET | Refills: 0 | Status: SHIPPED | OUTPATIENT
Start: 2021-10-19

## 2021-10-20 DIAGNOSIS — S32.030S CLOSED COMPRESSION FRACTURE OF L3 VERTEBRA, SEQUELA: ICD-10-CM

## 2021-10-20 DIAGNOSIS — G25.0 ESSENTIAL TREMOR: ICD-10-CM

## 2021-10-26 RX ORDER — GABAPENTIN 600 MG/1
TABLET ORAL
Qty: 90 TABLET | Refills: 0 | Status: SHIPPED | OUTPATIENT
Start: 2021-10-26

## 2021-10-26 RX ORDER — PHENOBARBITAL 60 MG/1
TABLET ORAL
Qty: 120 TABLET | Refills: 0 | Status: SHIPPED | OUTPATIENT
Start: 2021-10-26

## 2021-11-11 DIAGNOSIS — F41.9 ANXIETY: ICD-10-CM

## 2021-11-11 NOTE — TELEPHONE ENCOUNTER
Requested Prescriptions     Pending Prescriptions Disp Refills    clonazePAM (KlonoPIN) 0.5 mg tablet 120 Tablet 0     Sig: Take 1 Tablet by mouth four (4) times daily. Max Daily Amount: 2 mg.      Future Appointments   Date Time Provider Jewels Aguirre   11/19/2021  2:30 PM Demarco Lesches [facility] AMB   12/27/2021  1:20 PM [facility] [place] [facility] PANDA SCHED   12/27/2021  2:40 PM [facility] NURSE [facility] PANDA SCHED   1/4/2022  2:30 PM [place]

## 2021-11-12 RX ORDER — CLONAZEPAM 0.5 MG/1
0.5 TABLET ORAL 4 TIMES DAILY
Qty: 120 TABLET | Refills: 0 | Status: SHIPPED | OUTPATIENT
Start: 2021-11-12

## 2021-11-20 DIAGNOSIS — G47.00 INSOMNIA, UNSPECIFIED TYPE: ICD-10-CM

## 2021-11-21 RX ORDER — MIRTAZAPINE 15 MG/1
TABLET, FILM COATED ORAL
Qty: 90 TABLET | Refills: 0 | Status: SHIPPED | OUTPATIENT
Start: 2021-11-21

## 2021-12-02 ENCOUNTER — VIRTUAL VISIT (OUTPATIENT)
Dept: FAMILY MEDICINE CLINIC | Age: 66
End: 2021-12-02

## 2021-12-02 NOTE — LETTER
12/2/2021  [address] [address]    Dear Sunny Jaramillo,     According to our records, BARNEY Ramirez PA-C is your assigned primary care provider (PCP). We are writing to inform you that effective as of the date of this letter you will no longer be a patient of [employer], PA-C  or of [address]. The reason(s) we are discharging you from the practice are:  · ***  · ***    Your continued healthcare is important. We encourage you to find another PCP immediately. We will remain available to treat your urgent care needs for the next 30 days. [facility] [place] offers a comprehensive statewide directory for health and human services available in [place]. Call 2-1-1 or [phone] for details. You can also visit [email or web address] for more information. When you have selected another provider, please send us a signed records release authorization so we can provide a copy of your medical records and/or a summary of its content to your new provider. We have enclosed a release form for your convenience.      Sincerely,         [employer], PA-C    Enc: Authorization for Release of Medical Records

## 2021-12-02 NOTE — LETTER
12/2/2021    [address] [address]        Dear Jarek Eric,    We missed seeing you for a scheduled appointment at [address] [place] [address] with Debra Ferguson PA-C on 12/2/2021. Our goal is to offer the best possible care to our patients, so we are concerned when you are unable to keep a scheduled appointment. This is your second missed appointment and should you miss another you may be discharged from the practice. Please call us at [phone] so that we can reschedule if needed. We understand circumstances may arise which make it impossible for you to keep a scheduled appointment. Should this happen in the future, please call us as soon as you know the appointment will be missed. If you find it difficult to keep your appointments, please call our office and we may be able to provide the help that you need. We hope to hear from you soon.     Sincerely,    Debra Ferguson PA-C

## 2021-12-10 DIAGNOSIS — S32.030S CLOSED COMPRESSION FRACTURE OF L3 VERTEBRA, SEQUELA: ICD-10-CM

## 2021-12-10 DIAGNOSIS — G25.0 ESSENTIAL TREMOR: ICD-10-CM

## 2021-12-10 DIAGNOSIS — F41.9 ANXIETY: ICD-10-CM

## 2021-12-10 RX ORDER — PHENOBARBITAL 60 MG/1
TABLET ORAL
Qty: 120 TABLET | Refills: 0 | Status: CANCELLED | OUTPATIENT
Start: 2021-12-10

## 2021-12-10 RX ORDER — GABAPENTIN 600 MG/1
TABLET ORAL
Qty: 90 TABLET | Refills: 0 | Status: CANCELLED | OUTPATIENT
Start: 2021-12-10

## 2021-12-10 RX ORDER — CLONAZEPAM 0.5 MG/1
0.5 TABLET ORAL 4 TIMES DAILY
Qty: 120 TABLET | Refills: 0 | Status: CANCELLED | OUTPATIENT
Start: 2021-12-10

## 2021-12-10 NOTE — TELEPHONE ENCOUNTER
Hector requesting 10 day script sent to [facility] and new script sent to [facility] mail order. Requested Prescriptions     Pending Prescriptions Disp Refills    gabapentin (NEURONTIN) 600 mg tablet 90 Tablet 0     Sig: TAKE 1 TABLET BY MOUTH THREE TIMES DAILY    PHENobarbitaL (LUMINAL) 60 mg tablet 120 Tablet 0     Sig: TAKE 1 TABLET BY MOUTH FOUR TIMES DAILY    clonazePAM (KlonoPIN) 0.5 mg tablet 120 Tablet 0     Sig: Take 1 Tablet by mouth four (4) times daily. Max Daily Amount: 2 mg.

## 2021-12-13 ENCOUNTER — VIRTUAL VISIT (OUTPATIENT)
Dept: FAMILY MEDICINE CLINIC | Age: 66
End: 2021-12-13

## 2021-12-29 ENCOUNTER — DOCUMENTATION ONLY (OUTPATIENT)
Dept: FAMILY MEDICINE CLINIC | Age: 66
End: 2021-12-29

## 2021-12-29 ENCOUNTER — TELEPHONE (OUTPATIENT)
Dept: FAMILY MEDICINE CLINIC | Age: 66
End: 2021-12-29

## 2021-12-29 DIAGNOSIS — F41.9 ANXIETY: ICD-10-CM

## 2021-12-29 DIAGNOSIS — G47.00 INSOMNIA, UNSPECIFIED TYPE: ICD-10-CM

## 2021-12-29 DIAGNOSIS — S32.030S CLOSED COMPRESSION FRACTURE OF L3 VERTEBRA, SEQUELA: ICD-10-CM

## 2021-12-29 RX ORDER — GABAPENTIN 600 MG/1
TABLET ORAL
Qty: 90 TABLET | Refills: 0 | Status: CANCELLED | OUTPATIENT
Start: 2021-12-29

## 2021-12-29 RX ORDER — CLONAZEPAM 0.5 MG/1
0.5 TABLET ORAL 4 TIMES DAILY
Qty: 120 TABLET | Refills: 0 | Status: CANCELLED | OUTPATIENT
Start: 2021-12-29

## 2021-12-29 RX ORDER — MIRTAZAPINE 15 MG/1
15 TABLET, FILM COATED ORAL
Qty: 90 TABLET | Refills: 0 | Status: CANCELLED | OUTPATIENT
Start: 2021-12-29

## 2021-12-29 NOTE — TELEPHONE ENCOUNTER
Pt called and was insistent he be given a refill on his medication. Patient was irate and given both a number to reach the practice manager and patient advocacy. Implied that he would be calling back soon.

## 2021-12-29 NOTE — TELEPHONE ENCOUNTER
Pt called in demanding that he receive his medications today. I informed the patient that the nurse just talked with RAMIRO Ventura and she said that since he has not shown up to his appointments she cannot refill his medications. Patient stated that he really needs his medication and he has an upcoming appointment on Jan. 10th. I told the patient I would talk with Ms. Ventura's nurse and either she or myself will get back with him.

## 2021-12-29 NOTE — TELEPHONE ENCOUNTER
Pt due for OV and no showed for his last 3 visits: 9/02, 12/2, and 12/13 all for virtual visits so refills were denied.  Discharge letter can be sent for no shows

## 2021-12-29 NOTE — TELEPHONE ENCOUNTER
Pt called back saying that Ms. Ventura has denied his medications. I explained to the patient that Ms. Ventura is the only one that has the authority to refill his medications and it appears that she feels she cannot refill his medications due to his non-compliance. I explained that he missed his last three appointments. Mr. Alexander Ards asked me to request enough medication to get him to his next appointment. This request has been put in and Ms. Ventura has denied it.

## 2021-12-29 NOTE — TELEPHONE ENCOUNTER
LPN spoke with patient and expressed word for word what PCP wrote in chart of the denial of medication. Patient then got upset and demand I reach out to PCP again. I declined because the answer would yet be the same. Patient said he was seen in Sept. I told him he was not he was seen by urology and PCP still gave him a refill of his medication for 30 days for him to make an apt and be seen. Patient continue making apts and no showing. Patient said he is a cancer patient and shouldn't be stopped on his medication. LPN then advised that he should have been compliant and made one of his apts that was schedule but since he did not he can now go to urgent care or the ER and see if he can get a refill from them. Patient told LPN  to get in contact with PCP and hung up the phone LPN declined again PCP had said medication was denied.

## 2022-01-04 ENCOUNTER — TELEPHONE (OUTPATIENT)
Dept: INTERNAL MEDICINE CLINIC | Age: 67
End: 2022-01-04

## 2022-01-04 ENCOUNTER — TELEPHONE (OUTPATIENT)
Dept: FAMILY MEDICINE CLINIC | Age: 67
End: 2022-01-04

## 2022-01-04 ENCOUNTER — VIRTUAL VISIT (OUTPATIENT)
Dept: FAMILY MEDICINE CLINIC | Age: 67
End: 2022-01-04

## 2022-01-04 NOTE — PROGRESS NOTES
Pt did not respond to mult msgs sent to connect for visit. Also attempted to call pt and no answer. LMOM to advise pt of call and msgs to connect.

## 2022-01-04 NOTE — TELEPHONE ENCOUNTER
Mr. Diego Gilliland called in requesting a refill of his gabapentin, phenobarbital and klonopin. After I introduced myself the patient said \"oh Waldo Giovanis it's you\". I informed Mr. Diego Gilliland that Ms. Ventura is not going to refill his medicine due to non-compliance and multiple missed appointments. Mr. Diego Gilliland told me that he really needs his medicines and has been borrowing pills from his friends and that no one told him that he was not going to get his medicine. I informed Mr. Diego Gilliland that this was not accurate and that on separate phone calls last week both the nurse and I told him that he was not going to receive his medicine until he has an appointment with Ms. Ventura. Mr. Diego Gilliland then went on a long winded explination of how this is wrong and inhumane. He then demanded to speak with MsDeyanira Susannaheron's supervising physician. I told Mr. Diego Gilliland that Ms. Ventura's supervising physician was not available and that Ms. Ventura is managing his care. He continued to argue with me and I informed Mr. Diego Gilliland that I do not have the ability to refill his medicine, only Ms. Ventura has that authority, and she is denying his refills until he is seen. He demanded to be seen today. I told Mr. Diego Gilliland that Ms. Ventura is booked today but I would check with Ms. Ventura and see if he can be worked in before his appointment on 1/10.

## 2022-01-04 NOTE — TELEPHONE ENCOUNTER
I called the patient to let him know that Ms. Ventura will work him in today at 3:45pm and Ms. Ventura thinks it will be best if the patient finds another PCP for the future. Patient understood and agreed.

## 2022-01-06 ENCOUNTER — TELEPHONE (OUTPATIENT)
Dept: FAMILY MEDICINE CLINIC | Age: 67
End: 2022-01-06

## 2022-01-06 NOTE — TELEPHONE ENCOUNTER
----- Message from [facility] sent at 1/6/2022  1:30 PM EST -----  Subject: Message to Provider    QUESTIONS  Information for Provider? Ramin Claudia would like to apologize to Marielena Curtis. He would like for her to give him a call back. He would like to   talk to her personally and apologize. She can call or text him. He feels   bad for how he acted.   ---------------------------------------------------------------------------  --------------  [address]  What is the best way for the office to contact you? OK to leave message on   voicemail  Preferred Call Back Phone Number? [phone]  ---------------------------------------------------------------------------  --------------  SCRIPT ANSWERS  Relationship to Patient?  Self

## 2022-01-10 ENCOUNTER — TELEPHONE (OUTPATIENT)
Dept: FAMILY MEDICINE CLINIC | Age: 67
End: 2022-01-10

## 2022-01-10 NOTE — TELEPHONE ENCOUNTER
Pt had VV On 1/4/22 and did not respond to msgs to connect for visit nor did he answer phone call. On 1/06/22 around 9:20am pt connected onto doxy. me while I was on with another pt and sat on in waiting cue for about 6-7min

## 2022-05-02 PROBLEM — N18.30 CHRONIC RENAL DISEASE, STAGE III (HCC): Status: ACTIVE | Noted: 2022-05-02

## 2022-05-02 PROBLEM — N18.4 CKD (CHRONIC KIDNEY DISEASE) STAGE 4, GFR 15-29 ML/MIN (HCC): Status: ACTIVE | Noted: 2022-05-02

## 2022-06-04 ENCOUNTER — TELEPHONE ENCOUNTER (OUTPATIENT)
Dept: URBAN - METROPOLITAN AREA CLINIC 68 | Facility: CLINIC | Age: 67
End: 2022-06-04

## 2022-06-05 ENCOUNTER — TELEPHONE ENCOUNTER (OUTPATIENT)
Dept: URBAN - METROPOLITAN AREA CLINIC 68 | Facility: CLINIC | Age: 67
End: 2022-06-05

## 2022-06-25 ENCOUNTER — TELEPHONE ENCOUNTER (OUTPATIENT)
Age: 67
End: 2022-06-25

## 2022-06-26 ENCOUNTER — TELEPHONE ENCOUNTER (OUTPATIENT)
Age: 67
End: 2022-06-26

## 2022-07-29 DIAGNOSIS — F41.9 ANXIETY: ICD-10-CM

## 2022-07-29 RX ORDER — VENLAFAXINE 100 MG/1
TABLET ORAL
Qty: 270 TABLET | Refills: 1 | OUTPATIENT
Start: 2022-07-29